# Patient Record
Sex: MALE | Race: WHITE | NOT HISPANIC OR LATINO
[De-identification: names, ages, dates, MRNs, and addresses within clinical notes are randomized per-mention and may not be internally consistent; named-entity substitution may affect disease eponyms.]

---

## 2017-05-15 ENCOUNTER — APPOINTMENT (OUTPATIENT)
Dept: FAMILY MEDICINE | Facility: CLINIC | Age: 27
End: 2017-05-15

## 2017-05-15 VITALS
WEIGHT: 115 LBS | HEART RATE: 72 BPM | HEIGHT: 69 IN | BODY MASS INDEX: 17.03 KG/M2 | SYSTOLIC BLOOD PRESSURE: 120 MMHG | RESPIRATION RATE: 12 BRPM | OXYGEN SATURATION: 99 % | TEMPERATURE: 98 F | DIASTOLIC BLOOD PRESSURE: 80 MMHG

## 2017-06-16 ENCOUNTER — APPOINTMENT (OUTPATIENT)
Dept: FAMILY MEDICINE | Facility: CLINIC | Age: 27
End: 2017-06-16

## 2017-06-16 VITALS
HEART RATE: 70 BPM | HEIGHT: 69 IN | BODY MASS INDEX: 16.88 KG/M2 | SYSTOLIC BLOOD PRESSURE: 120 MMHG | WEIGHT: 114 LBS | DIASTOLIC BLOOD PRESSURE: 80 MMHG

## 2017-06-16 RX ORDER — CLINDAMYCIN HYDROCHLORIDE 300 MG/1
300 CAPSULE ORAL
Qty: 21 | Refills: 0 | Status: DISCONTINUED | COMMUNITY
Start: 2017-05-15 | End: 2017-06-16

## 2017-06-20 LAB
25(OH)D3 SERPL-MCNC: 25 NG/ML
ALBUMIN SERPL ELPH-MCNC: 5 G/DL
ALP BLD-CCNC: 112 U/L
ALT SERPL-CCNC: 21 U/L
ANION GAP SERPL CALC-SCNC: 21 MMOL/L
APPEARANCE: CLEAR
AST SERPL-CCNC: 31 U/L
BASOPHILS # BLD AUTO: 0.05 K/UL
BASOPHILS NFR BLD AUTO: 0.9 %
BILIRUB SERPL-MCNC: 0.8 MG/DL
BILIRUBIN URINE: NEGATIVE
BLOOD URINE: NEGATIVE
BUN SERPL-MCNC: 13 MG/DL
CALCIUM SERPL-MCNC: 10.3 MG/DL
CHLORIDE SERPL-SCNC: 101 MMOL/L
CHOLEST SERPL-MCNC: 163 MG/DL
CHOLEST/HDLC SERPL: 2.3 RATIO
CO2 SERPL-SCNC: 22 MMOL/L
COLOR: YELLOW
CREAT SERPL-MCNC: 1.16 MG/DL
EOSINOPHIL # BLD AUTO: 0.13 K/UL
EOSINOPHIL NFR BLD AUTO: 2.4 %
GLUCOSE QUALITATIVE U: NORMAL MG/DL
GLUCOSE SERPL-MCNC: 85 MG/DL
HBA1C MFR BLD HPLC: 4.9 %
HCT VFR BLD CALC: 41.8 %
HDLC SERPL-MCNC: 72 MG/DL
HGB BLD-MCNC: 13.4 G/DL
IMM GRANULOCYTES NFR BLD AUTO: 0.2 %
KETONES URINE: NEGATIVE
LDLC SERPL CALC-MCNC: 74 MG/DL
LEUKOCYTE ESTERASE URINE: NEGATIVE
LYMPHOCYTES # BLD AUTO: 2.79 K/UL
LYMPHOCYTES NFR BLD AUTO: 50.5 %
MAN DIFF?: NORMAL
MCHC RBC-ENTMCNC: 27.9 PG
MCHC RBC-ENTMCNC: 32.1 GM/DL
MCV RBC AUTO: 86.9 FL
MONOCYTES # BLD AUTO: 0.35 K/UL
MONOCYTES NFR BLD AUTO: 6.3 %
NEUTROPHILS # BLD AUTO: 2.19 K/UL
NEUTROPHILS NFR BLD AUTO: 39.7 %
NITRITE URINE: NEGATIVE
PH URINE: 6
PLATELET # BLD AUTO: 217 K/UL
POTASSIUM SERPL-SCNC: 4.2 MMOL/L
PROT SERPL-MCNC: 7 G/DL
PROTEIN URINE: NEGATIVE MG/DL
RBC # BLD: 4.81 M/UL
RBC # FLD: 13.2 %
SODIUM SERPL-SCNC: 144 MMOL/L
SPECIFIC GRAVITY URINE: 1.02
TRIGL SERPL-MCNC: 84 MG/DL
TSH SERPL-ACNC: 1.6 UIU/ML
UROBILINOGEN URINE: NORMAL MG/DL
WBC # FLD AUTO: 5.52 K/UL

## 2017-08-14 ENCOUNTER — APPOINTMENT (OUTPATIENT)
Dept: FAMILY MEDICINE | Facility: CLINIC | Age: 27
End: 2017-08-14
Payer: MEDICARE

## 2017-08-14 VITALS
TEMPERATURE: 98 F | BODY MASS INDEX: 16.44 KG/M2 | HEART RATE: 72 BPM | DIASTOLIC BLOOD PRESSURE: 80 MMHG | WEIGHT: 111 LBS | SYSTOLIC BLOOD PRESSURE: 120 MMHG | HEIGHT: 69 IN

## 2017-08-14 PROCEDURE — 69210 REMOVE IMPACTED EAR WAX UNI: CPT

## 2017-08-14 PROCEDURE — 99213 OFFICE O/P EST LOW 20 MIN: CPT | Mod: 25

## 2018-04-12 ENCOUNTER — MESSAGE (OUTPATIENT)
Age: 28
End: 2018-04-12

## 2018-07-20 ENCOUNTER — APPOINTMENT (OUTPATIENT)
Dept: FAMILY MEDICINE | Facility: CLINIC | Age: 28
End: 2018-07-20
Payer: MEDICARE

## 2018-07-20 VITALS
BODY MASS INDEX: 16.44 KG/M2 | WEIGHT: 108.44 LBS | SYSTOLIC BLOOD PRESSURE: 120 MMHG | HEIGHT: 68 IN | DIASTOLIC BLOOD PRESSURE: 80 MMHG | HEART RATE: 72 BPM

## 2018-07-20 PROCEDURE — 99214 OFFICE O/P EST MOD 30 MIN: CPT

## 2018-07-20 NOTE — PLAN
[FreeTextEntry1] : Patient advised to go to [facility] as poor dental hygiene causing potential infection, leading to ear pain and cheek pain.\par \par In the interim, patient prescribed Clindamycin for 7 days, TIB by mouth and oral gel. Advised on better dental hygiene with alcohol mouthwash, flossing, and saltwater rinses.

## 2018-07-20 NOTE — HISTORY OF PRESENT ILLNESS
[Earache (L)] : pain in left ear [FreeTextEntry8] : Patient reports right sided ear pain that started a few days ago. Patient reports his right cheek and ear are painful. Report ear pain is associated with right-sided molar pain. Denies sore throat, cough, nasal discharge, ear discharge, fever, chills or headache.

## 2018-07-20 NOTE — ASSESSMENT
[FreeTextEntry1] : Poor dental hygiene- start clindamycin 300 mg po tid/mouth wash\par Possible infection causing ear pain\par [facility]

## 2018-07-20 NOTE — PHYSICAL EXAM

## 2019-03-05 ENCOUNTER — APPOINTMENT (OUTPATIENT)
Dept: FAMILY MEDICINE | Facility: CLINIC | Age: 29
End: 2019-03-05
Payer: MEDICARE

## 2019-03-05 VITALS
WEIGHT: 106 LBS | OXYGEN SATURATION: 97 % | HEART RATE: 70 BPM | DIASTOLIC BLOOD PRESSURE: 60 MMHG | TEMPERATURE: 98.1 F | HEIGHT: 68 IN | RESPIRATION RATE: 14 BRPM | SYSTOLIC BLOOD PRESSURE: 100 MMHG | BODY MASS INDEX: 16.06 KG/M2

## 2019-03-05 DIAGNOSIS — H61.23 IMPACTED CERUMEN, BILATERAL: ICD-10-CM

## 2019-03-05 DIAGNOSIS — K04.7 PERIAPICAL ABSCESS W/OUT SINUS: ICD-10-CM

## 2019-03-05 PROCEDURE — 36415 COLL VENOUS BLD VENIPUNCTURE: CPT

## 2019-03-05 PROCEDURE — G0439: CPT

## 2019-03-05 RX ORDER — CLINDAMYCIN HYDROCHLORIDE 300 MG/1
300 CAPSULE ORAL
Qty: 21 | Refills: 0 | Status: DISCONTINUED | COMMUNITY
Start: 2018-07-20 | End: 2019-03-05

## 2019-03-05 RX ORDER — CLINDAMYCIN PHOSPHATE AND BENZOYL PEROXIDE 10; 50 MG/G; MG/G
1.2-5 GEL TOPICAL TWICE DAILY
Qty: 1 | Refills: 0 | Status: DISCONTINUED | COMMUNITY
Start: 2018-07-20 | End: 2019-03-05

## 2019-03-05 NOTE — PHYSICAL EXAM
[General Appearance - Alert] : alert [General Appearance - In No Acute Distress] : in no acute distress [Sclera] : the sclera and conjunctiva were normal [Neck Appearance] : the appearance of the neck was normal [Respiration, Rhythm And Depth] : normal respiratory rhythm and effort [Auscultation Breath Sounds / Voice Sounds] : lungs were clear to auscultation bilaterally [Heart Rate And Rhythm] : heart rate was normal and rhythm regular [Heart Sounds] : normal S1 and S2 [Edema] : there was no peripheral edema [Abdomen Soft] : soft [Abdomen Tenderness] : non-tender [Cervical Lymph Nodes Enlarged Posterior Bilaterally] : posterior cervical [Cervical Lymph Nodes Enlarged Anterior Bilaterally] : anterior cervical [Supraclavicular Lymph Nodes Enlarged Bilaterally] : supraclavicular [No Spinal Tenderness] : no spinal tenderness [Abnormal Walk] : normal gait [Involuntary Movements] : no involuntary movements were seen [Musculoskeletal - Swelling] : no joint swelling seen [Skin Turgor] : normal skin turgor [] : no rash [No Focal Deficits] : no focal deficits [Oriented To Time, Place, And Person] : oriented to person, place, and time [Oropharynx] : the oropharynx was normal [FreeTextEntry1] :  more engaging and smiling

## 2019-03-05 NOTE — ASSESSMENT
[Non - Smoker] : non-smoker [FreeTextEntry2] : States he "runs" sometimes.   Advised daily walk !! [FreeTextEntry1] : CPE for 28 year old WM with PMH as stated in Active list.\par Improving depression\par Form s completed for [facility] services. \par \par Labs in office.\par \par Best wishes !

## 2019-03-05 NOTE — REVIEW OF SYSTEMS
[see HPI] : see HPI [Negative] : Neurological [Fever] : no fever [Chills] : no chills [Constipation] : no constipation [Diarrhea] : no diarrhea [Heartburn] : no heartburn
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
## 2019-03-05 NOTE — HEALTH RISK ASSESSMENT
[0] : 1) Little interest or pleasure doing things: Not at all (0) [1] : 2) Feeling down, depressed, or hopeless for several days (1) [Good] : ~his/her~  mood as  good [] : No [de-identified] : denies [de-identified] : some bowling  "not much else"  [de-identified] : POOR!

## 2019-03-05 NOTE — HEALTH RISK ASSESSMENT
[0] : 1) Little interest or pleasure doing things: Not at all (0) [1] : 2) Feeling down, depressed, or hopeless for several days (1) [Good] : ~his/her~  mood as  good [] : No [de-identified] : denies [de-identified] : some bowling  "not much else"  [de-identified] : POOR!

## 2019-03-15 LAB
ALBUMIN SERPL ELPH-MCNC: 4.9 G/DL
ALP BLD-CCNC: 100 U/L
ALT SERPL-CCNC: 15 U/L
ANION GAP SERPL CALC-SCNC: 14 MMOL/L
AST SERPL-CCNC: 27 U/L
BASOPHILS # BLD AUTO: 0.07 K/UL
BASOPHILS NFR BLD AUTO: 1.2 %
BILIRUB SERPL-MCNC: 0.4 MG/DL
BUN SERPL-MCNC: 10 MG/DL
CALCIUM SERPL-MCNC: 10.1 MG/DL
CHLORIDE SERPL-SCNC: 103 MMOL/L
CO2 SERPL-SCNC: 26 MMOL/L
CREAT SERPL-MCNC: 0.9 MG/DL
EOSINOPHIL # BLD AUTO: 0.14 K/UL
EOSINOPHIL NFR BLD AUTO: 2.5 %
GLUCOSE SERPL-MCNC: 83 MG/DL
HCT VFR BLD CALC: 44.5 %
HGB BLD-MCNC: 14.7 G/DL
IMM GRANULOCYTES NFR BLD AUTO: 0.2 %
LYMPHOCYTES # BLD AUTO: 2.22 K/UL
LYMPHOCYTES NFR BLD AUTO: 39.5 %
MAN DIFF?: NORMAL
MCHC RBC-ENTMCNC: 28.6 PG
MCHC RBC-ENTMCNC: 33 GM/DL
MCV RBC AUTO: 86.6 FL
MONOCYTES # BLD AUTO: 0.31 K/UL
MONOCYTES NFR BLD AUTO: 5.5 %
NEUTROPHILS # BLD AUTO: 2.87 K/UL
NEUTROPHILS NFR BLD AUTO: 51.1 %
PLATELET # BLD AUTO: 253 K/UL
POTASSIUM SERPL-SCNC: 4.7 MMOL/L
PROT SERPL-MCNC: 6.8 G/DL
RBC # BLD: 5.14 M/UL
RBC # FLD: 12.2 %
SODIUM SERPL-SCNC: 143 MMOL/L
TSH SERPL-ACNC: 1.02 UIU/ML
WBC # FLD AUTO: 5.62 K/UL

## 2019-05-29 ENCOUNTER — RX RENEWAL (OUTPATIENT)
Age: 29
End: 2019-05-29

## 2019-12-09 ENCOUNTER — APPOINTMENT (OUTPATIENT)
Dept: FAMILY MEDICINE | Facility: CLINIC | Age: 29
End: 2019-12-09
Payer: MEDICARE

## 2019-12-09 VITALS
HEIGHT: 68 IN | DIASTOLIC BLOOD PRESSURE: 70 MMHG | SYSTOLIC BLOOD PRESSURE: 100 MMHG | WEIGHT: 117 LBS | BODY MASS INDEX: 17.73 KG/M2 | HEART RATE: 84 BPM

## 2019-12-09 PROCEDURE — 99214 OFFICE O/P EST MOD 30 MIN: CPT | Mod: 25

## 2019-12-09 PROCEDURE — 69209 REMOVE IMPACTED EAR WAX UNI: CPT | Mod: RT,59

## 2019-12-09 NOTE — PHYSICAL EXAM
[Normal Sclera/Conjunctiva] : normal sclera/conjunctiva [No Acute Distress] : no acute distress [No Rash] : no rash [No Accessory Muscle Use] : no accessory muscle use [No Respiratory Distress] : no respiratory distress  [No Focal Deficits] : no focal deficits [Alert and Oriented x3] : oriented to person, place, and time [de-identified] : OMM  poor dentition   cerumen RIGHT EAR  [place]  [de-identified] : warm and dry

## 2019-12-09 NOTE — HISTORY OF PRESENT ILLNESS
[Earache (L)] : pain in left ear [___ Weeks ago] :  [unfilled] weeks ago [Earache (R)] : pain in right ear [Earache] : earache [Headache] : headache [Worsening] : worsening [Sore Throat] : no sore throat [Congestion] : no congestion [Cough] : no cough [Wheezing] : no wheezing [Chills] : no chills [Shortness Of Breath] : no shortness of breath [Fever] : no fever [Fatigue] : not fatigue [de-identified] : for few weeks   "I can't hear from this ear"  HX of same.  [FreeTextEntry8] : Cerumen impaction in the past was well.\par USED Debrox drops for last 5 days

## 2019-12-09 NOTE — ASSESSMENT
[FreeTextEntry1] : Successful irrigation of RIGHT EAR and patient endorses "hearing fine".\par No complications.

## 2019-12-20 ENCOUNTER — TRANSCRIPTION ENCOUNTER (OUTPATIENT)
Age: 29
End: 2019-12-20

## 2020-11-23 ENCOUNTER — APPOINTMENT (OUTPATIENT)
Dept: FAMILY MEDICINE | Facility: CLINIC | Age: 30
End: 2020-11-23
Payer: MEDICARE

## 2020-11-23 VITALS
HEART RATE: 91 BPM | BODY MASS INDEX: 19.1 KG/M2 | SYSTOLIC BLOOD PRESSURE: 110 MMHG | OXYGEN SATURATION: 98 % | HEIGHT: 68 IN | DIASTOLIC BLOOD PRESSURE: 60 MMHG | TEMPERATURE: 98.3 F | WEIGHT: 126 LBS

## 2020-11-23 PROCEDURE — 99214 OFFICE O/P EST MOD 30 MIN: CPT

## 2020-11-25 NOTE — PHYSICAL EXAM
[No Acute Distress] : no acute distress [Normal Sclera/Conjunctiva] : normal sclera/conjunctiva [No JVD] : no jugular venous distention [No Accessory Muscle Use] : no accessory muscle use [Clear to Auscultation] : lungs were clear to auscultation bilaterally [Regular Rhythm] : with a regular rhythm [Normal S1, S2] : normal S1 and S2 [No Edema] : there was no peripheral edema [Soft] : abdomen soft [Non Tender] : non-tender [Non-distended] : non-distended [No HSM] : no HSM [No Spinal Tenderness] : no spinal tenderness [No Joint Swelling] : no joint swelling [No Rash] : no rash [No Focal Deficits] : no focal deficits [Speech Grossly Normal] : speech grossly normal [Alert and Oriented x3] : oriented to person, place, and time [de-identified] : weak smiling and reasonable eye contact   CALM  [de-identified] : OMM  POOR DENTITION  [de-identified] : poor posture humped position       can  sit up straight  ad advised  [de-identified] : warm and dry  [de-identified] : although paucity of speech...

## 2020-11-25 NOTE — ASSESSMENT
[FreeTextEntry1] : agree with plan.\par Encouraged regular FU and regular daily walks .\par \par advised Multi-vitamin daily and Ensure PRN \par \par Best wishes offered. \par \par FLU vaccine in hospital.
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
## 2020-11-25 NOTE — REVIEW OF SYSTEMS
[Joint Stiffness] : joint stiffness [Back Pain] : back pain [Negative] : Neurological [Fever] : no fever [Chills] : no chills [Night Sweats] : no night sweats [Anxiety] : no anxiety [FreeTextEntry9] : back HUMPED due to poor posture   [de-identified] : improved depression

## 2020-12-03 RX ORDER — DIVALPROEX SODIUM 500 MG/1
500 TABLET, DELAYED RELEASE ORAL
Qty: 30 | Refills: 0 | Status: ACTIVE | COMMUNITY
Start: 2019-03-05 | End: 1900-01-01

## 2020-12-03 RX ORDER — DIVALPROEX SODIUM 250 MG/1
250 TABLET, EXTENDED RELEASE ORAL
Qty: 30 | Refills: 0 | Status: ACTIVE | COMMUNITY
Start: 2020-11-25 | End: 1900-01-01

## 2021-01-18 RX ORDER — RISPERIDONE 2 MG/1
2 TABLET, ORALLY DISINTEGRATING ORAL DAILY
Qty: 30 | Refills: 0 | Status: ACTIVE | COMMUNITY
Start: 2020-11-25 | End: 1900-01-01

## 2021-01-24 ENCOUNTER — RX RENEWAL (OUTPATIENT)
Age: 31
End: 2021-01-24

## 2021-01-24 RX ORDER — TRAZODONE HYDROCHLORIDE 50 MG/1
50 TABLET ORAL
Qty: 45 | Refills: 0 | Status: ACTIVE | COMMUNITY
Start: 2019-03-05 | End: 1900-01-01

## 2021-04-01 ENCOUNTER — APPOINTMENT (OUTPATIENT)
Dept: FAMILY MEDICINE | Facility: CLINIC | Age: 31
End: 2021-04-01
Payer: MEDICARE

## 2021-04-01 VITALS
BODY MASS INDEX: 22.73 KG/M2 | WEIGHT: 150 LBS | HEIGHT: 68 IN | DIASTOLIC BLOOD PRESSURE: 70 MMHG | SYSTOLIC BLOOD PRESSURE: 120 MMHG | HEART RATE: 103 BPM

## 2021-04-01 DIAGNOSIS — H61.21 IMPACTED CERUMEN, RIGHT EAR: ICD-10-CM

## 2021-04-01 DIAGNOSIS — Z09 ENCOUNTER FOR FOLLOW-UP EXAMINATION AFTER COMPLETED TREATMENT FOR CONDITIONS OTHER THAN MALIGNANT NEOPLASM: ICD-10-CM

## 2021-04-01 DIAGNOSIS — R62.50 UNSPECIFIED LACK OF EXPECTED NORMAL PHYSIOLOGICAL DEVELOPMENT IN CHILDHOOD: ICD-10-CM

## 2021-04-01 PROCEDURE — G0444 DEPRESSION SCREEN ANNUAL: CPT | Mod: 59

## 2021-04-01 PROCEDURE — 36415 COLL VENOUS BLD VENIPUNCTURE: CPT

## 2021-04-01 PROCEDURE — 99215 OFFICE O/P EST HI 40 MIN: CPT | Mod: 25

## 2021-04-01 RX ORDER — SERTRALINE HYDROCHLORIDE 50 MG/1
50 TABLET, FILM COATED ORAL DAILY
Qty: 90 | Refills: 0 | Status: DISCONTINUED | COMMUNITY
Start: 2019-03-05 | End: 2021-04-01

## 2021-04-07 PROBLEM — H61.21 IMPACTED CERUMEN OF RIGHT EAR: Status: RESOLVED | Noted: 2019-12-09 | Resolved: 2021-04-07

## 2021-04-07 PROBLEM — Z09 HOSPITAL DISCHARGE FOLLOW-UP: Status: RESOLVED | Noted: 2020-11-25 | Resolved: 2021-04-07

## 2021-04-07 LAB
ALBUMIN SERPL ELPH-MCNC: 4.7 G/DL
ALP BLD-CCNC: 107 U/L
ALT SERPL-CCNC: 26 U/L
ANION GAP SERPL CALC-SCNC: 14 MMOL/L
AST SERPL-CCNC: 35 U/L
BASOPHILS # BLD AUTO: 0.08 K/UL
BASOPHILS NFR BLD AUTO: 1.5 %
BILIRUB SERPL-MCNC: 0.3 MG/DL
BUN SERPL-MCNC: 18 MG/DL
CALCIUM SERPL-MCNC: 10.1 MG/DL
CHLORIDE SERPL-SCNC: 100 MMOL/L
CHOLEST SERPL-MCNC: 175 MG/DL
CO2 SERPL-SCNC: 26 MMOL/L
CREAT SERPL-MCNC: 0.97 MG/DL
EOSINOPHIL # BLD AUTO: 0.36 K/UL
EOSINOPHIL NFR BLD AUTO: 6.7 %
ESTIMATED AVERAGE GLUCOSE: 94 MG/DL
GLUCOSE SERPL-MCNC: 91 MG/DL
HBA1C MFR BLD HPLC: 4.9 %
HCT VFR BLD CALC: 44.5 %
HDLC SERPL-MCNC: 54 MG/DL
HGB BLD-MCNC: 14.7 G/DL
IMM GRANULOCYTES NFR BLD AUTO: 0.4 %
LDLC SERPL CALC-MCNC: 85 MG/DL
LYMPHOCYTES # BLD AUTO: 1.85 K/UL
LYMPHOCYTES NFR BLD AUTO: 34.5 %
MAN DIFF?: NORMAL
MCHC RBC-ENTMCNC: 28.4 PG
MCHC RBC-ENTMCNC: 33 GM/DL
MCV RBC AUTO: 86.1 FL
MONOCYTES # BLD AUTO: 0.39 K/UL
MONOCYTES NFR BLD AUTO: 7.3 %
NEUTROPHILS # BLD AUTO: 2.67 K/UL
NEUTROPHILS NFR BLD AUTO: 49.6 %
NONHDLC SERPL-MCNC: 120 MG/DL
PLATELET # BLD AUTO: 225 K/UL
POTASSIUM SERPL-SCNC: 4.9 MMOL/L
PROLACTIN SERPL-MCNC: 115 NG/ML
PROT SERPL-MCNC: 6.7 G/DL
RBC # BLD: 5.17 M/UL
RBC # FLD: 12.3 %
SODIUM SERPL-SCNC: 140 MMOL/L
TRIGL SERPL-MCNC: 175 MG/DL
WBC # FLD AUTO: 5.37 K/UL

## 2021-04-07 NOTE — PLAN
[FreeTextEntry1] : - Advised patient that gender change is a huge decision, and there is a process. \par - Patient gives full permission for me to speak to the physician that he has been talking to. [phone]\par - Blood work to be done in office today.

## 2021-04-07 NOTE — END OF VISIT
[FreeTextEntry3] : Medical record entries made by the scribe today, were at my direction and personally dictated to them by me, Dr. Geno Jiménez on 4/1/21. I have reviewed the chart and agree that the record accurately reflects my personal performance of the history, physical exam, assessment and plan.

## 2021-04-07 NOTE — ASSESSMENT
[FreeTextEntry1] : I was able to reach Dr. Delgado who endorses he is an on-line  provider for gender dysphoria patients and HIV management.\par \par He endorses that NEY was not prescribed estradiol and Aldactone until after labs in late February and advised to stop them when he reviewed labs and abnormalities  were noted.\par \par I advised him Ney states he has been taking meds. since late February; Dr. Delgado sates he did not prescribe until March 5 or "so".\par \par Dr. Delgado  was unaware of Ney's  cognitive delay and depression and recent hospitalization for  depression.  \par He agreed patient should not be under his care due to underlying conditions and he was to call Ney and advise him.\par  \par I will repeat labs in office today and plan MRI of brain if necessary and refer to endocrinology. \par \par Mother to be advised as well.

## 2021-04-07 NOTE — HEALTH RISK ASSESSMENT
[0] : 1) Little interest or pleasure doing things: Not at all (0) [1] : 2) Feeling down, depressed, or hopeless for several days (1) [[id number]] : 1 Normal rate, regular rhythm.  Heart sounds S1, S2.

## 2021-04-07 NOTE — PHYSICAL EXAM
[General Appearance - Alert] : alert [General Appearance - In No Acute Distress] : in no acute distress [Sclera] : the sclera and conjunctiva were normal [Oropharynx] : the oropharynx was normal [Neck Appearance] : the appearance of the neck was normal [Respiration, Rhythm And Depth] : normal respiratory rhythm and effort [Auscultation Breath Sounds / Voice Sounds] : lungs were clear to auscultation bilaterally [Heart Rate And Rhythm] : heart rate was normal and rhythm regular [Heart Sounds] : normal S1 and S2 [Edema] : there was no peripheral edema [Abdomen Soft] : soft [Abdomen Tenderness] : non-tender [Cervical Lymph Nodes Enlarged Posterior Bilaterally] : posterior cervical [Cervical Lymph Nodes Enlarged Anterior Bilaterally] : anterior cervical [Supraclavicular Lymph Nodes Enlarged Bilaterally] : supraclavicular [No Spinal Tenderness] : no spinal tenderness [Involuntary Movements] : no involuntary movements were seen [Abnormal Walk] : normal gait [Musculoskeletal - Swelling] : no joint swelling seen [Skin Turgor] : normal skin turgor [] : no rash [No Focal Deficits] : no focal deficits [Oriented To Time, Place, And Person] : oriented to person, place, and time [FreeTextEntry1] :  more engaging and smiling

## 2021-04-07 NOTE — HISTORY OF PRESENT ILLNESS
[Parent] : parent [FreeTextEntry1] : FU irregular lab results.  [de-identified] : Patient presents s/p abnormal lab results dated 3/9 2021 with elevated PROLACTIN and LoW testosterone and high estrogen. \par   Mother states that  Ney  spoke to a doctor ( Danny Nye) ( [phone] )   online (4-5x) who is  [place] of state,  via several phone calls, and now he is taking hormones (estradiol).  NEY was advised to FU with PCP for repeat labs and "maybe an MRI". \par  States he feels more a women than a man, and that he feels he "is in the wrong body." Ney confirms that he started to feel this way a couple years ago. Blood work was done at [facility] 3/9/21. REVIEWED and noted for abnormal ties.\par Ney states he started estradiol 2 mgs on 2/28 .  Patient also confirms that he is feeling slightly depressed. Confirms that he is no longer in a program, and is not doing anything all day. Physician that he has been speaking to confirms that his Prolactin levels were dangerously high and advised him to se PCP.\par NEY  States he began estradiol and Aldactone both on 2/28 \par \par Mother endorses he continues with [facility] and NP Maya Deras is provider.. [phone]

## 2021-04-15 LAB
TESTOST BND SERPL-MCNC: 3.9 PG/ML
TESTOST SERPL-MCNC: NORMAL

## 2021-05-10 ENCOUNTER — APPOINTMENT (OUTPATIENT)
Dept: MRI IMAGING | Facility: CLINIC | Age: 31
End: 2021-05-10
Payer: MEDICARE

## 2021-05-10 ENCOUNTER — OUTPATIENT (OUTPATIENT)
Dept: OUTPATIENT SERVICES | Facility: HOSPITAL | Age: 31
LOS: 1 days | End: 2021-05-10
Payer: MEDICARE

## 2021-05-10 DIAGNOSIS — F64.9 GENDER IDENTITY DISORDER, UNSPECIFIED: ICD-10-CM

## 2021-05-10 DIAGNOSIS — R79.89 OTHER SPECIFIED ABNORMAL FINDINGS OF BLOOD CHEMISTRY: ICD-10-CM

## 2021-05-10 PROCEDURE — G1004: CPT

## 2021-05-10 PROCEDURE — 70552 MRI BRAIN STEM W/DYE: CPT | Mod: 26,ME

## 2021-05-10 PROCEDURE — 70552 MRI BRAIN STEM W/DYE: CPT

## 2021-05-10 PROCEDURE — A9585: CPT

## 2021-08-17 ENCOUNTER — TRANSCRIPTION ENCOUNTER (OUTPATIENT)
Age: 31
End: 2021-08-17

## 2021-08-19 ENCOUNTER — APPOINTMENT (OUTPATIENT)
Dept: FAMILY MEDICINE | Facility: CLINIC | Age: 31
End: 2021-08-19
Payer: MEDICARE

## 2021-08-19 VITALS
OXYGEN SATURATION: 98 % | SYSTOLIC BLOOD PRESSURE: 124 MMHG | BODY MASS INDEX: 25.91 KG/M2 | HEART RATE: 102 BPM | HEIGHT: 68 IN | WEIGHT: 171 LBS | DIASTOLIC BLOOD PRESSURE: 72 MMHG

## 2021-08-19 DIAGNOSIS — Z86.59 PERSONAL HISTORY OF OTHER MENTAL AND BEHAVIORAL DISORDERS: ICD-10-CM

## 2021-08-19 PROCEDURE — 99214 OFFICE O/P EST MOD 30 MIN: CPT

## 2021-08-25 NOTE — PLAN
[FreeTextEntry1] : Well exam for 30 year y/o M with PMH as noted in HPI/active list.\par \par Management:\par \par See HPI\par \par ADVISED Walk 30 min 2x / day!\par COnsider GYM ! \par \par WATCH SWEETS and bread! \par \par \par \par Best wishes offered !

## 2021-08-25 NOTE — END OF VISIT
[FreeTextEntry3] : Medical record entries made by the scribe today were at my direction and personally dictated to them by me, Dr. Geno Russell on 08/19/2021. I have reviewed the chart and agree that the record accurately reflects my personal performance of the history, physical exam, assessment and plan.

## 2021-08-25 NOTE — PHYSICAL EXAM
[No Acute Distress] : no acute distress [Normal Sclera/Conjunctiva] : normal sclera/conjunctiva [No JVD] : no jugular venous distention [No Respiratory Distress] : no respiratory distress  [No Accessory Muscle Use] : no accessory muscle use [Normal Rate] : normal rate  [No Rash] : no rash [Coordination Grossly Intact] : coordination grossly intact [Alert and Oriented x3] : oriented to person, place, and time [Soft] : abdomen soft [Non Tender] : non-tender [de-identified] : OMM  POOR DENTITION   Has appt. at [facility]  [de-identified] : B LE edema  non pittting

## 2021-08-25 NOTE — HISTORY OF PRESENT ILLNESS
[FreeTextEntry8] : Accompanied by mother: \par \par Ney is a 31 y/o M who presents to the office today c/o swollen ankles for last few weeks. \par \par States his activity has been decreased as he is not  working OR EXERCISING ; "SITS Alot" \par \par  Weight GAIN ( Depakote) and not watching his diet as much.

## 2021-09-20 ENCOUNTER — APPOINTMENT (OUTPATIENT)
Dept: FAMILY MEDICINE | Facility: CLINIC | Age: 31
End: 2021-09-20
Payer: MEDICARE

## 2021-09-20 VITALS
OXYGEN SATURATION: 97 % | BODY MASS INDEX: 26.22 KG/M2 | HEART RATE: 98 BPM | HEIGHT: 68 IN | SYSTOLIC BLOOD PRESSURE: 118 MMHG | WEIGHT: 173 LBS | DIASTOLIC BLOOD PRESSURE: 78 MMHG

## 2021-09-20 DIAGNOSIS — F64.9 GENDER IDENTITY DISORDER, UNSPECIFIED: ICD-10-CM

## 2021-09-20 PROCEDURE — 99213 OFFICE O/P EST LOW 20 MIN: CPT

## 2021-09-27 PROBLEM — F64.9 GENDER IDENTITY DISORDER: Status: ACTIVE | Noted: 2021-04-07

## 2021-09-27 NOTE — PHYSICAL EXAM
[No Acute Distress] : no acute distress [Normal Sclera/Conjunctiva] : normal sclera/conjunctiva [No JVD] : no jugular venous distention [No Respiratory Distress] : no respiratory distress  [No Accessory Muscle Use] : no accessory muscle use [Normal Rate] : normal rate  [Soft] : abdomen soft [Non Tender] : non-tender [No Rash] : no rash [Coordination Grossly Intact] : coordination grossly intact [Alert and Oriented x3] : oriented to person, place, and time [de-identified] : OMM  POOR DENTITION   Has appt. at [facility]  [de-identified] : B LE edema  non pittting

## 2021-09-27 NOTE — HISTORY OF PRESENT ILLNESS
[FreeTextEntry1] : Pt is following up on swollen ankles as noted at encounter in AUGUST 2021.\par SInce has  been more active "even playing tennis some" and cutting back on sweets.\par \par Has appt. with Endocrine at [place] in the near future to discuss hormone therapy.\par \par Also has FU with [facility].  [FreeTextEntry8] : IN REVIEW: August 19:\par \par Accompanied by mother: \par \par Ney is a 31 y/o M who presents to the office today c/o swollen ankles for last few weeks. \par \par States his activity has been decreased as he is not  working OR EXERCISING ; "SITS Alot" \par \par  Weight GAIN ( Depakote) and not watching his diet as much.

## 2021-09-27 NOTE — ASSESSMENT
[FreeTextEntry1] : LEG swelling no appreciable and likely due to weight   gain and atrophy. \par \par COntinue current efforts!!\par \par FU as noted with endocrine \par \par

## 2021-11-11 ENCOUNTER — APPOINTMENT (OUTPATIENT)
Dept: ENDOCRINOLOGY | Facility: CLINIC | Age: 31
End: 2021-11-11

## 2022-02-10 ENCOUNTER — APPOINTMENT (OUTPATIENT)
Dept: ENDOCRINOLOGY | Facility: CLINIC | Age: 32
End: 2022-02-10
Payer: MEDICARE

## 2022-02-10 VITALS
HEIGHT: 68 IN | HEART RATE: 114 BPM | RESPIRATION RATE: 16 BRPM | DIASTOLIC BLOOD PRESSURE: 82 MMHG | SYSTOLIC BLOOD PRESSURE: 124 MMHG | OXYGEN SATURATION: 98 % | WEIGHT: 179 LBS | BODY MASS INDEX: 27.13 KG/M2 | TEMPERATURE: 98.1 F

## 2022-02-10 PROCEDURE — 99204 OFFICE O/P NEW MOD 45 MIN: CPT

## 2022-02-10 RX ORDER — TRAZODONE HYDROCHLORIDE 50 MG/1
50 TABLET ORAL
Qty: 54 | Refills: 2 | Status: COMPLETED | COMMUNITY
Start: 2020-11-25 | End: 2022-02-10

## 2022-02-17 ENCOUNTER — APPOINTMENT (OUTPATIENT)
Dept: FAMILY MEDICINE | Facility: CLINIC | Age: 32
End: 2022-02-17
Payer: MEDICARE

## 2022-02-17 ENCOUNTER — NON-APPOINTMENT (OUTPATIENT)
Age: 32
End: 2022-02-17

## 2022-02-17 VITALS
SYSTOLIC BLOOD PRESSURE: 110 MMHG | BODY MASS INDEX: 26.22 KG/M2 | HEART RATE: 110 BPM | DIASTOLIC BLOOD PRESSURE: 90 MMHG | HEIGHT: 68 IN | WEIGHT: 173 LBS

## 2022-02-17 DIAGNOSIS — Z87.898 PERSONAL HISTORY OF OTHER SPECIFIED CONDITIONS: ICD-10-CM

## 2022-02-17 PROCEDURE — 93000 ELECTROCARDIOGRAM COMPLETE: CPT

## 2022-02-17 PROCEDURE — 99214 OFFICE O/P EST MOD 30 MIN: CPT | Mod: 25

## 2022-02-23 PROBLEM — Z87.898 HISTORY OF CARIES: Status: RESOLVED | Noted: 2017-05-15 | Resolved: 2022-02-23

## 2022-02-23 LAB
ESTRADIOL SERPL-MCNC: 9 PG/ML
FSH SERPL-MCNC: 5.3 IU/L
IGF-1 INTERP: NORMAL
IGF-I BLD-MCNC: 195 NG/ML
LH SERPL-ACNC: 5.6 IU/L
PROLACTIN SERPL-MCNC: 77 NG/ML
T3 SERPL-MCNC: 167 NG/DL
T4 FREE SERPL-MCNC: 1.3 NG/DL
TSH SERPL-ACNC: 2.08 UIU/ML

## 2022-02-23 NOTE — ADDENDUM
[FreeTextEntry1] : Medical record entries made by the scribe today, were at my direction and personally dictated to them by me, Dr. Geno Jiménez on 02/17/2022.  I have reviewed the chart and agree that the record accurately reflects my personal performance of the history, physical exam, assessment and plan.\par \par Helio HANDLEY acting as scribe for Dr. Geno Jiménez MD on 02/17/2022 at 3:19 PM.\par

## 2022-02-23 NOTE — HISTORY OF PRESENT ILLNESS
[FreeTextEntry1] : FU\par Last seen in September 2021 for FU swollen ankles, encounter reviewed.  [de-identified] : SALIMA ABEL is a 31 year old M who presents today for a follow up visit.  Pt presented to the office today in hopes of having an echocardiogram performed, explained to pt that echo cannot be done here, pt completely understanding.  \par \par Pt states that he is not currently working, feels "good."  States that he has not asked psychiatrist for assistance with job search.\par  Endocrinology Consult for Pituitary adenoma reviewed and noted for some LAB AU pending as well as advised ECHO as he had VSD as child.\par \par DENTURES complete and look GREAT!

## 2022-02-23 NOTE — PHYSICAL EXAM
[No Acute Distress] : no acute distress [Normal Sclera/Conjunctiva] : normal sclera/conjunctiva [No JVD] : no jugular venous distention [No Respiratory Distress] : no respiratory distress  [Normal Rate] : normal rate  [No Edema] : there was no peripheral edema [No Focal Deficits] : no focal deficits [Speech Grossly Normal] : speech grossly normal [Alert and Oriented x3] : oriented to person, place, and time [de-identified] : calm and engaging

## 2022-02-23 NOTE — ASSESSMENT
[FreeTextEntry1] : FU for 31 year old WM with PMH as stated in HPI / active list. \par \par Management : \par \par See HPI and Plan.\par \par EKG in office today, will advise.  Cardiology referral provided for echo.  \par \par Encouraged to ask his psychiatrist or consult with  family service league to assist with job search.   \par \par Best wishes offered!

## 2022-02-24 LAB
CORTIS 24H UR-MCNC: 24 H
CORTIS 24H UR-MRATE: 6 MCG/24 H
SPECIMEN VOL 24H UR: 1325 ML

## 2022-02-25 ENCOUNTER — NON-APPOINTMENT (OUTPATIENT)
Age: 32
End: 2022-02-25

## 2022-03-31 ENCOUNTER — APPOINTMENT (OUTPATIENT)
Dept: FAMILY MEDICINE | Facility: CLINIC | Age: 32
End: 2022-03-31
Payer: MEDICARE

## 2022-03-31 VITALS
DIASTOLIC BLOOD PRESSURE: 80 MMHG | HEART RATE: 89 BPM | HEIGHT: 68 IN | WEIGHT: 172 LBS | BODY MASS INDEX: 26.07 KG/M2 | OXYGEN SATURATION: 98 % | SYSTOLIC BLOOD PRESSURE: 122 MMHG

## 2022-03-31 PROCEDURE — 99214 OFFICE O/P EST MOD 30 MIN: CPT

## 2022-04-05 NOTE — REVIEW OF SYSTEMS
[see HPI] : see HPI [Negative] : Neurological [Fever] : no fever [Chills] : no chills [Constipation] : no constipation [Diarrhea] : no diarrhea [Heartburn] : no heartburn Dressing (No Sutures): dry sterile dressing

## 2022-04-09 NOTE — HISTORY OF PRESENT ILLNESS
[FreeTextEntry1] : FU\par Last seen in February 2022 for same, encounter reviewed.  [de-identified] : NEY ABEL is a 31 year old M who presents today for a follow up visit regarding right leg pain s/p mechanical fall that occurred Saturday 3/26/22.  Pt is accompanied by his mother in office today, mom states that he fell and has been complaining of RLE and groin pain since the fall.  Mother states that they have been treating pain with naproxen for pain, qd.\par \par Ney is concerned for discomfort and swelling in Right groin.\par \par Endo appreciated for evaluation of  pituitary microadenoma; only abnormal hormone lab is elevated prolactin.. \par NEEDS ECHO ( hx VSD) prior to treatment weekly with cabergoline. \par \par Mother and patient advised again. WIll FU. \par

## 2022-04-09 NOTE — ADDENDUM
[FreeTextEntry1] : Medical record entries made by the scribe today, were at my direction and personally dictated to them by me, Dr. Geno Jiménez on 03/31/2022.  I have reviewed the chart and agree that the record accurately reflects my personal performance of the history, physical exam, assessment and plan.\par \par Helio HANDLEY acting as scribe for Dr. Geno Jiménez MD on 03/31/2022 at 4:37 PM.\par

## 2022-04-09 NOTE — PHYSICAL EXAM
[No Acute Distress] : no acute distress [Normal Sclera/Conjunctiva] : normal sclera/conjunctiva [No JVD] : no jugular venous distention [No Respiratory Distress] : no respiratory distress  [Normal Rate] : normal rate  [No Edema] : there was no peripheral edema [Soft] : abdomen soft [Non Tender] : non-tender [No Hernias] : no hernias [No Focal Deficits] : no focal deficits [Speech Grossly Normal] : speech grossly normal [Alert and Oriented x3] : oriented to person, place, and time [Regular Rhythm] : with a regular rhythm [de-identified] : calm and somewhat engaging  [de-identified] : no hernia present  inguinal or umbilical  [de-identified] : RIGHT LE with no considerable swelling or injury   FROM and strength of ankle.  [de-identified] : flat-footed gait

## 2022-04-09 NOTE — REVIEW OF SYSTEMS
[Joint Pain] : joint pain [Negative] : Psychiatric [Fever] : no fever [Chills] : no chills [Night Sweats] : no night sweats [FreeTextEntry9] : RIGHT ankle pain, see HPI

## 2022-07-28 ENCOUNTER — APPOINTMENT (OUTPATIENT)
Dept: ENDOCRINOLOGY | Facility: CLINIC | Age: 32
End: 2022-07-28

## 2022-07-28 VITALS
SYSTOLIC BLOOD PRESSURE: 130 MMHG | OXYGEN SATURATION: 98 % | HEIGHT: 68 IN | BODY MASS INDEX: 27.58 KG/M2 | DIASTOLIC BLOOD PRESSURE: 80 MMHG | HEART RATE: 87 BPM | TEMPERATURE: 98 F | WEIGHT: 182 LBS | RESPIRATION RATE: 16 BRPM

## 2022-07-28 PROCEDURE — 99214 OFFICE O/P EST MOD 30 MIN: CPT

## 2022-09-22 ENCOUNTER — NON-APPOINTMENT (OUTPATIENT)
Age: 32
End: 2022-09-22

## 2022-12-01 ENCOUNTER — APPOINTMENT (OUTPATIENT)
Dept: FAMILY MEDICINE | Facility: CLINIC | Age: 32
End: 2022-12-01

## 2022-12-01 VITALS
WEIGHT: 178 LBS | OXYGEN SATURATION: 98 % | BODY MASS INDEX: 26.98 KG/M2 | SYSTOLIC BLOOD PRESSURE: 130 MMHG | HEIGHT: 68 IN | HEART RATE: 92 BPM | DIASTOLIC BLOOD PRESSURE: 90 MMHG

## 2022-12-01 PROCEDURE — 99214 OFFICE O/P EST MOD 30 MIN: CPT

## 2022-12-02 ENCOUNTER — APPOINTMENT (OUTPATIENT)
Dept: ENDOCRINOLOGY | Facility: CLINIC | Age: 32
End: 2022-12-02

## 2022-12-05 NOTE — ADDENDUM
[FreeTextEntry1] : Medical record entries made by the scribe today, were at my direction and personally dictated to them by me, Dr. Geno Jiménez on 12/01/2022.  I have reviewed the chart and agree that the record accurately reflects my personal performance of the history, physical exam, assessment and plan.\par \par Helio HANDLEY acting as scribe for Dr. Geno Jiménez MD on 12/01/2022 at 3:51 PM.\par

## 2022-12-05 NOTE — PHYSICAL EXAM
[No Acute Distress] : no acute distress [Normal Sclera/Conjunctiva] : normal sclera/conjunctiva [No Respiratory Distress] : no respiratory distress  [No Accessory Muscle Use] : no accessory muscle use [de-identified] : calm and engaging  [de-identified] : OMM  no teeth  does have dentures   BEARDED  [de-identified] : candidiasis present right lower extremity/groin region

## 2022-12-05 NOTE — ASSESSMENT
[FreeTextEntry1] : Acute encounter for 32 year old WM with PMH as stated in HPI / active list. \par \par Management : \par \par See HPI and Plan.\par \par Nystatin cream alternating with powder  to be applied to affected area, apply BID.  Advised thin layer of powder to be applied at bed time.\par \par Advised to keep area dry, dry well after showering.  \par \par Best wishes offered!

## 2022-12-05 NOTE — HISTORY OF PRESENT ILLNESS
[Parent] : parent [FreeTextEntry8] : SALIMA ABEL is a 32 year old M who presents today for acute rash on Right leg onset "months ago."  Right lower extremity.  Pt states it is itchy, home remedies provided no relief.  \par \par Accompanied by his mother today.

## 2023-01-05 ENCOUNTER — APPOINTMENT (OUTPATIENT)
Dept: FAMILY MEDICINE | Facility: CLINIC | Age: 33
End: 2023-01-05
Payer: MEDICARE

## 2023-01-05 VITALS
OXYGEN SATURATION: 98 % | WEIGHT: 180 LBS | HEART RATE: 104 BPM | DIASTOLIC BLOOD PRESSURE: 84 MMHG | HEIGHT: 68 IN | SYSTOLIC BLOOD PRESSURE: 124 MMHG | BODY MASS INDEX: 27.28 KG/M2

## 2023-01-05 VITALS — HEART RATE: 98 BPM

## 2023-01-05 DIAGNOSIS — Z00.00 ENCOUNTER FOR GENERAL ADULT MEDICAL EXAMINATION W/OUT ABNORMAL FINDINGS: ICD-10-CM

## 2023-01-05 DIAGNOSIS — B37.2 CANDIDIASIS OF SKIN AND NAIL: ICD-10-CM

## 2023-01-05 DIAGNOSIS — M79.89 OTHER SPECIFIED SOFT TISSUE DISORDERS: ICD-10-CM

## 2023-01-05 DIAGNOSIS — F32.A DEPRESSION, UNSPECIFIED: ICD-10-CM

## 2023-01-05 PROCEDURE — 99395 PREV VISIT EST AGE 18-39: CPT | Mod: 25,GY

## 2023-01-05 PROCEDURE — 36415 COLL VENOUS BLD VENIPUNCTURE: CPT

## 2023-01-05 RX ORDER — NYSTATIN 100000 [USP'U]/G
100000 CREAM TOPICAL TWICE DAILY
Qty: 1 | Refills: 1 | Status: DISCONTINUED | COMMUNITY
Start: 2022-12-01 | End: 2023-01-05

## 2023-01-05 RX ORDER — NYSTATIN 100000 1/G
100000 POWDER TOPICAL 3 TIMES DAILY
Qty: 1 | Refills: 1 | Status: DISCONTINUED | COMMUNITY
Start: 2022-12-01 | End: 2023-01-05

## 2023-01-08 LAB
ALBUMIN SERPL ELPH-MCNC: 5.1 G/DL
ALP BLD-CCNC: 125 U/L
ALT SERPL-CCNC: 43 U/L
ANION GAP SERPL CALC-SCNC: 19 MMOL/L
AST SERPL-CCNC: 33 U/L
BASOPHILS # BLD AUTO: 0.08 K/UL
BASOPHILS NFR BLD AUTO: 1.1 %
BILIRUB SERPL-MCNC: 0.7 MG/DL
BUN SERPL-MCNC: 13 MG/DL
CALCIUM SERPL-MCNC: 10.1 MG/DL
CHLORIDE SERPL-SCNC: 100 MMOL/L
CHOLEST SERPL-MCNC: 174 MG/DL
CO2 SERPL-SCNC: 24 MMOL/L
CREAT SERPL-MCNC: 1.06 MG/DL
EGFR: 96 ML/MIN/1.73M2
EOSINOPHIL # BLD AUTO: 0.27 K/UL
EOSINOPHIL NFR BLD AUTO: 3.6 %
ESTIMATED AVERAGE GLUCOSE: 100 MG/DL
GLUCOSE SERPL-MCNC: 80 MG/DL
HBA1C MFR BLD HPLC: 5.1 %
HCT VFR BLD CALC: 46.8 %
HDLC SERPL-MCNC: 49 MG/DL
HGB BLD-MCNC: 15.6 G/DL
IMM GRANULOCYTES NFR BLD AUTO: 0.1 %
LDLC SERPL CALC-MCNC: 78 MG/DL
LYMPHOCYTES # BLD AUTO: 3.92 K/UL
LYMPHOCYTES NFR BLD AUTO: 52.9 %
MAN DIFF?: NORMAL
MCHC RBC-ENTMCNC: 28.9 PG
MCHC RBC-ENTMCNC: 33.3 GM/DL
MCV RBC AUTO: 86.8 FL
MONOCYTES # BLD AUTO: 0.44 K/UL
MONOCYTES NFR BLD AUTO: 5.9 %
NEUTROPHILS # BLD AUTO: 2.69 K/UL
NEUTROPHILS NFR BLD AUTO: 36.4 %
NONHDLC SERPL-MCNC: 125 MG/DL
PLATELET # BLD AUTO: 249 K/UL
POTASSIUM SERPL-SCNC: 4.5 MMOL/L
PROLACTIN SERPL-MCNC: 80.7 NG/ML
PROT SERPL-MCNC: 7 G/DL
RBC # BLD: 5.39 M/UL
RBC # FLD: 12.7 %
SODIUM SERPL-SCNC: 143 MMOL/L
T4 SERPL-MCNC: 8.3 UG/DL
TRIGL SERPL-MCNC: 235 MG/DL
TSH SERPL-ACNC: 2.02 UIU/ML
WBC # FLD AUTO: 7.41 K/UL

## 2023-01-08 NOTE — HEALTH RISK ASSESSMENT
[Good] : ~his/her~  mood as  good [de-identified] : denies [de-identified] : some bowling  "not much else"  [de-identified] : POOR!

## 2023-01-08 NOTE — PLAN
[FreeTextEntry1] : In review March 2019:\par CPE for 28 year old WM with PMH as stated in Active list.\par Improving depression\par Form s completed for [facility] services. \par \par Labs in office.\par \par Best wishes !

## 2023-01-08 NOTE — PHYSICAL EXAM
[General Appearance - Alert] : alert [General Appearance - In No Acute Distress] : in no acute distress [Sclera] : the sclera and conjunctiva were normal [Oropharynx] : the oropharynx was normal [Neck Appearance] : the appearance of the neck was normal [Respiration, Rhythm And Depth] : normal respiratory rhythm and effort [Auscultation Breath Sounds / Voice Sounds] : lungs were clear to auscultation bilaterally [Heart Rate And Rhythm] : heart rate was normal and rhythm regular [Heart Sounds] : normal S1 and S2 [Edema] : there was no peripheral edema [Abdomen Soft] : soft [Abdomen Tenderness] : non-tender [Cervical Lymph Nodes Enlarged Posterior Bilaterally] : posterior cervical [Cervical Lymph Nodes Enlarged Anterior Bilaterally] : anterior cervical [Supraclavicular Lymph Nodes Enlarged Bilaterally] : supraclavicular [No Spinal Tenderness] : no spinal tenderness [Abnormal Walk] : normal gait [Involuntary Movements] : no involuntary movements were seen [Musculoskeletal - Swelling] : no joint swelling seen [Skin Turgor] : normal skin turgor [] : no rash [No Focal Deficits] : no focal deficits [Oriented To Time, Place, And Person] : oriented to person, place, and time [FreeTextEntry1] :  more engaging and smiling

## 2023-01-08 NOTE — HISTORY OF PRESENT ILLNESS
[FreeTextEntry1] : CPE\par Last seen in December 2022 for acute rash, last CPE March 2019, encounters reviewed.\par Recent consults reviewed and noted for:\par Endo (Prolactin increased, Pituitary adenoma f/u, July 2022) NOTED FOR STABILITY\par Hormone replacement of estrogen not advised due to prolactinoma.  No discussion initiated by patient today \par Continues Psych care at [facility] . [de-identified] : \humza SALIMA ABEL is a 32 year old M who presents today for a CPE.  Pt has no complaints, has been well, and denies any recent ER visits/hospitalizations/MVA's or MSK injuries.  States rash from December 2022 encounter has resolved completely.  \par \par Pt states some back pain, states he typically hunches while standing and sitting.  Sleeps primarily on back, does not believes he snores, states no one tells him that he snores.  Has no complaints regarding sleep or sleeping habits.  \par \par Pt states no changes to psych medications, states compliance, typically takes medications in early afternoon.  States compliance with all medications as directed.  \humza gaines Presents with dentures in, states bottom dentures do not fit, plans on following with dentist regarding.  \humza gaines Is now working with his father as a .

## 2023-01-08 NOTE — COUNSELING
[de-identified] : Again the value and NEVA  of daily walk, 30 minutes 5 x's per week,  stressed with regard  to overall health, mental health and bone density\par \par Importance of drinking water of 60-80 ounces daily discussed and encouraged.\par

## 2023-01-08 NOTE — ASSESSMENT
[FreeTextEntry1] : CPE for 32 year old WM with PMH as stated in HPI / active list. \par \par Management : \par \par See HPI and Plan.\par \par Labs in office today, will advise. Medications to be reconciled.\par \par Advised dental f/u regarding bottom dentures.  \par \par Advised improved posture regarding intermittent back pains, demonstrated in office today with understanding shown by pt.  \par \par Continue current medication regimens. Continue healthy lifestyle choices!\par \par Pt declined the influenza vaccine today. \par \par Best wishes offered!

## 2023-01-08 NOTE — REVIEW OF SYSTEMS
[Negative] : Neurological [see HPI] : see HPI [Fever] : no fever [Chills] : no chills [Constipation] : no constipation [Diarrhea] : no diarrhea [Heartburn] : no heartburn

## 2023-02-09 ENCOUNTER — APPOINTMENT (OUTPATIENT)
Dept: ENDOCRINOLOGY | Facility: CLINIC | Age: 33
End: 2023-02-09
Payer: MEDICARE

## 2023-02-09 VITALS
RESPIRATION RATE: 15 BRPM | BODY MASS INDEX: 27.89 KG/M2 | WEIGHT: 184 LBS | HEIGHT: 68 IN | DIASTOLIC BLOOD PRESSURE: 85 MMHG | SYSTOLIC BLOOD PRESSURE: 141 MMHG | TEMPERATURE: 98 F | HEART RATE: 99 BPM | OXYGEN SATURATION: 97 %

## 2023-02-09 PROCEDURE — 99214 OFFICE O/P EST MOD 30 MIN: CPT

## 2023-05-02 ENCOUNTER — APPOINTMENT (OUTPATIENT)
Dept: MRI IMAGING | Facility: CLINIC | Age: 33
End: 2023-05-02
Payer: MEDICARE

## 2023-05-02 ENCOUNTER — OUTPATIENT (OUTPATIENT)
Dept: OUTPATIENT SERVICES | Facility: HOSPITAL | Age: 33
LOS: 1 days | End: 2023-05-02
Payer: MEDICARE

## 2023-05-02 DIAGNOSIS — D35.2 BENIGN NEOPLASM OF PITUITARY GLAND: ICD-10-CM

## 2023-05-02 PROCEDURE — 70553 MRI BRAIN STEM W/O & W/DYE: CPT | Mod: 26,MH

## 2023-05-02 PROCEDURE — A9585: CPT

## 2023-05-02 PROCEDURE — 70553 MRI BRAIN STEM W/O & W/DYE: CPT

## 2023-05-05 ENCOUNTER — EMERGENCY (EMERGENCY)
Facility: HOSPITAL | Age: 33
LOS: 1 days | Discharge: HOME | End: 2023-05-05
Attending: EMERGENCY MEDICINE
Payer: MEDICARE

## 2023-05-05 VITALS
DIASTOLIC BLOOD PRESSURE: 90 MMHG | SYSTOLIC BLOOD PRESSURE: 137 MMHG | TEMPERATURE: 103 F | WEIGHT: 184.97 LBS | HEART RATE: 85 BPM | OXYGEN SATURATION: 97 % | RESPIRATION RATE: 18 BRPM

## 2023-05-05 PROCEDURE — 99283 EMERGENCY DEPT VISIT LOW MDM: CPT | Mod: FS

## 2023-05-05 PROCEDURE — 99282 EMERGENCY DEPT VISIT SF MDM: CPT

## 2023-05-05 NOTE — ED PROVIDER NOTE - PATIENT PORTAL LINK FT
You can access the FollowMyHealth Patient Portal offered by [facility] by registering at the following website: [email or web address]. By joining [employer]’s FollowMyHealth portal, you will also be able to view your health information using other applications (apps) compatible with our system.

## 2023-05-05 NOTE — ED PROVIDER NOTE - NS ED ATTENDING STATEMENT MOD
This was a shared visit with the AURA. I reviewed and verified the documentation and independently performed the documented:

## 2023-05-05 NOTE — ED PROVIDER NOTE - OBJECTIVE STATEMENT
32 year old male PMHx aspergers syndrome, pituitary adenoma presents to ED for bruising of left arm. Pt mother reports he had IV placed on tuesday for MRI, at that time nurse stated "hard time to get stick". Pt reports bruising and swelling to area occurred 2 hours after IV removal. Pt reports soreness to area of bruise that is about 3bfq0mo. Pt denies fevers, chills, discharge, streaking, redness, sweats, coldness, numbness, tingling, weakness, SOB, chest pain, abdominal pain.

## 2023-05-05 NOTE — ED PROVIDER NOTE - CLINICAL SUMMARY MEDICAL DECISION MAKING FREE TEXT BOX
32 year old male PMHx aspergers syndrome, pituitary adenoma presents to ED for bruising of left arm. Pt mother reports he had IV placed on tuesday for MRI, at that time nurse stated "hard time to get stick". Pt reports bruising and swelling to area occurred 2 hours after IV removal. Pt reports soreness to area of bruise that is about 2qfv2tv. Pt denies fevers, chills, discharge, streaking, redness, sweats, coldness, numbness, tingling, weakness, SOB, chest pain, abdominal pain.  +ecchymosis left ventral forearm.  Ice pack, pt education, pt does not wish to stay for IBU at this time.   Pt reassessed, pt feeling better at this time, vss, pt able to walk, talk and vocalized plan of action. Discussed in depth and explained to pt in depth the next steps that need to be taken including proper follow up with PCP or specialists. All incidental findings were discussed with pt as well. Pt verbalized their concerns and all questions were answered. Pt understands dispo and wants discharge. Given good instructions when to return to ED, strict return precautions and importance of f/u.

## 2023-05-05 NOTE — ED ADULT TRIAGE NOTE - CHIEF COMPLAINT QUOTE
Pt c/o L arm bruise/pain after having IV removed at [facility]. Pt noted to be febrile. Pt c/o L arm bruise/pain after having IV removed at [facility].

## 2023-05-05 NOTE — ED PROVIDER NOTE - PHYSICAL EXAMINATION
Gen: No acute distress, non toxic  HEENT: Mucous membranes moist, pink conjunctivae, EOMI  CV: RRR, nl s1/s2.  Resp: CTAB, normal rate and effort  GI: Abdomen soft, NT, ND. No rebound, no guarding  : No CVAT  Neuro: A&O x 3, moving all 4 extremities  MSK: Left Forearm non-tender. No spine or joint tenderness to palpation  Skin: Left proximal ventral forearm with [id number] ecchymosis  purple, yellow color. No fluctuance, erythema, swelling, discharge. No rashes. intact and perfused. Cap refill <2sec  Vascular: Radial and dorsalis pedal pulses 2+ b/l

## 2023-05-05 NOTE — ED PROVIDER NOTE - NSFOLLOWUPINSTRUCTIONS_ED_ALL_ED_FT
- Apply ice to affected area for 15-20 minutes every few hours for the next few days.  Use as much or as frequently as desired.   - take ibuprofen every 6 hours as needed      SEEK IMMEDIATE MEDICAL CARE IF YOU HAVE ANY OF THE FOLLOWING SYMPTOMS: worsening fever, red streaks coming from affected area, vomiting or diarrhea, or dizziness/lightheadedness.

## 2023-05-06 NOTE — ED ADULT NURSE NOTE - NS ED NURSE LEVEL OF CONSCIOUSNESS MENTAL STATUS
"Principal Problem:Left knee DJD    Principal Orthopedic Problem: sp left TKA 10/5/2020 by Dr. Israel    Interval History: NAEON. Pain controlled. Ambulated 110 feet with PT yesterday. Denies N/V, chest pain, or SOB. Voiding without difficulty.     Review of patient's allergies indicates:   Allergen Reactions    Hydrocodone-acetaminophen Rash     Other reaction(s): constipated       Current Facility-Administered Medications   Medication    0.9%  NaCl infusion    acetaminophen tablet 1,000 mg    aspirin EC tablet 81 mg    bisacodyL suppository 10 mg    celecoxib capsule 200 mg    dextrose 50% injection 12.5 g    dextrose 50% injection 25 g    famotidine tablet 20 mg    glucagon (human recombinant) injection 1 mg    glucose chewable tablet 16 g    glucose chewable tablet 24 g    insulin aspart U-100 pen 0-5 Units    levothyroxine tablet 125 mcg    metoprolol tartrate (LOPRESSOR) tablet 25 mg    morphine injection 2 mg    mupirocin 2 % ointment 1 g    naloxone 0.4 mg/mL injection 0.02 mg    ondansetron injection 4 mg    oxyCODONE immediate release tablet 10 mg    oxyCODONE immediate release tablet 5 mg    polyethylene glycol packet 17 g    pregabalin capsule 75 mg    promethazine (PHENERGAN) 6.25 mg in dextrose 5 % 50 mL IVPB    ramipriL capsule 10 mg    ropivacaine (PF) 2 mg/ml (0.2%) infusion    ropivacaine 0.2% ON-Q C-BLOC 400 ML (SELECT A FLOW)    senna-docusate 8.6-50 mg per tablet 1 tablet    tamsulosin 24 hr capsule 0.4 mg     Objective:     Vital Signs (Most Recent):  Temp: 98.5 °F (36.9 °C) (10/06/20 0436)  Pulse: 70 (10/06/20 0436)  Resp: 16 (10/06/20 0436)  BP: 126/67 (10/06/20 0436)  SpO2: 97 % (10/06/20 0436) Vital Signs (24h Range):  Temp:  [95.8 °F (35.4 °C)-98.5 °F (36.9 °C)] 98.5 °F (36.9 °C)  Pulse:  [53-90] 70  Resp:  [8-21] 16  SpO2:  [95 %-100 %] 97 %  BP: ()/(60-84) 126/67     Weight: 78.5 kg (173 lb)  Height: 5' 7" (170.2 cm)  Body mass index is 27.1 " kg/m².      Intake/Output Summary (Last 24 hours) at 10/6/2020 0641  Last data filed at 10/5/2020 1700  Gross per 24 hour   Intake 4281.37 ml   Output --   Net 4281.37 ml       Ortho/SPM Exam  Left knee exam  Polar back in place over bulky dressings   Bulky dressings removed and surgical dressing inspected- c/d/i with no strike through  Able to straight leg raise  5/5 DF/PF ankle and hallux  Toes WWP    Adductor canal catheter in place at 6cc/hr      Significant Labs: All pertinent labs within the past 24 hours have been reviewed.     POC glucose 165-266    Significant Imaging: I have reviewed all pertinent imaging results/findings.   Awake/Alert

## 2023-05-06 NOTE — ED ADULT NURSE NOTE - NSIMPLEMENTINTERV_GEN_ALL_ED
Implemented All Universal Safety Interventions:  [place] to call system. Call bell, personal items and telephone within reach. Instruct patient to call for assistance. Room bathroom lighting operational. Non-slip footwear when patient is off stretcher. Physically safe environment: no spills, clutter or unnecessary equipment. Stretcher in lowest position, wheels locked, appropriate side rails in place.

## 2023-05-06 NOTE — ED ADULT NURSE NOTE - NS_SISCREENINGSR_GEN_ALL_ED
[de-identified] : 53yo f with right hamstring strain/ intermittent R knee effusion\par \par 1) Aleve as needed\par 2) cryotherapy\par 3) Patient has failed 6 weeks of conservative management, including PT. Will obtain R knee MRI to eval MMT\par 4) f/u after MRI. Will consider custom knee brace at that time\par \par Patient seen by Zoraida Hayward PA-C, with and under the supervision of Dr. Jong Quinones M.D. \par  Negative

## 2023-05-24 ENCOUNTER — APPOINTMENT (OUTPATIENT)
Dept: ENDOCRINOLOGY | Facility: CLINIC | Age: 33
End: 2023-05-24
Payer: MEDICARE

## 2023-05-24 VITALS
WEIGHT: 180 LBS | OXYGEN SATURATION: 98 % | HEART RATE: 91 BPM | DIASTOLIC BLOOD PRESSURE: 82 MMHG | BODY MASS INDEX: 27.28 KG/M2 | SYSTOLIC BLOOD PRESSURE: 122 MMHG | HEIGHT: 68 IN

## 2023-05-24 PROBLEM — F84.5 ASPERGER'S SYNDROME: Chronic | Status: ACTIVE | Noted: 2023-05-05

## 2023-05-24 PROCEDURE — 99214 OFFICE O/P EST MOD 30 MIN: CPT

## 2023-09-04 ENCOUNTER — NON-APPOINTMENT (OUTPATIENT)
Age: 33
End: 2023-09-04

## 2023-09-05 ENCOUNTER — NON-APPOINTMENT (OUTPATIENT)
Age: 33
End: 2023-09-05

## 2023-09-07 ENCOUNTER — NON-APPOINTMENT (OUTPATIENT)
Age: 33
End: 2023-09-07

## 2023-10-11 ENCOUNTER — NON-APPOINTMENT (OUTPATIENT)
Age: 33
End: 2023-10-11

## 2023-11-09 ENCOUNTER — EMERGENCY (EMERGENCY)
Facility: HOSPITAL | Age: 33
LOS: 0 days | Discharge: HOME | End: 2023-11-09
Attending: EMERGENCY MEDICINE
Payer: MEDICARE

## 2023-11-09 VITALS — WEIGHT: 184.97 LBS | HEIGHT: 68 IN

## 2023-11-09 VITALS
DIASTOLIC BLOOD PRESSURE: 80 MMHG | HEART RATE: 78 BPM | RESPIRATION RATE: 18 BRPM | SYSTOLIC BLOOD PRESSURE: 128 MMHG | OXYGEN SATURATION: 98 % | TEMPERATURE: 98 F

## 2023-11-09 DIAGNOSIS — S05.01XA INJURY OF CONJUNCTIVA AND CORNEAL ABRASION WITHOUT FOREIGN BODY, RIGHT EYE, INITIAL ENCOUNTER: ICD-10-CM
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
## 2023-11-09 DIAGNOSIS — F84.5 ASPERGER'S SYNDROME: ICD-10-CM

## 2023-11-09 DIAGNOSIS — Z88.0 ALLERGY STATUS TO PENICILLIN: ICD-10-CM

## 2023-11-09 PROCEDURE — 99284 EMERGENCY DEPT VISIT MOD MDM: CPT

## 2023-11-09 PROCEDURE — 99283 EMERGENCY DEPT VISIT LOW MDM: CPT

## 2023-11-09 RX ORDER — OFLOXACIN 0.3 %
2 DROPS OPHTHALMIC (EYE) ONCE
Refills: 0 | Status: COMPLETED | OUTPATIENT
Start: 2023-11-09 | End: 2023-11-09

## 2023-11-09 RX ORDER — FLUORESCEIN SODIUM 9 MG
1 STRIP OPHTHALMIC (EYE) ONCE
Refills: 0 | Status: COMPLETED | OUTPATIENT
Start: 2023-11-09 | End: 2023-11-09

## 2023-11-09 RX ORDER — OFLOXACIN 0.3 %
1 DROPS OPHTHALMIC (EYE) ONCE
Refills: 0 | Status: DISCONTINUED | OUTPATIENT
Start: 2023-11-09 | End: 2023-11-09

## 2023-11-09 RX ADMIN — Medication 1 APPLICATION(S): at 12:05

## 2023-11-09 RX ADMIN — Medication 1 DROP(S): at 12:05

## 2023-11-09 RX ADMIN — Medication 2 DROP(S): at 12:35

## 2023-11-09 NOTE — ED STATDOCS - PATIENT PORTAL LINK FT
You can access the FollowMyHealth Patient Portal offered by [facility] by registering at the following website: [email or web address]. By joining [employer]’s FollowMyHealth portal, you will also be able to view your health information using other applications (apps) compatible with our system.

## 2023-11-09 NOTE — ED STATDOCS - NSFOLLOWUPCLINICS_GEN_ALL_ED_FT
[facility]hthal[facility]  Ophthalmology  [address] [place] [address], [facility] [address]  [place] [address]  Phone: [phone]  Fax:

## 2023-11-09 NOTE — ED STATDOCS - CARE PROVIDER_API CALL
[facility]  Ophthalmology  [address] [place] [address]  Phone: [phone]  Fax: [phone]  Follow Up Time:

## 2023-11-09 NOTE — ED STATDOCS - PHYSICAL EXAMINATION
Constitutional: NAD AAOx3  Eyes: EOMI, pupils equal, mild redness subconjunctival, no active drainage, No pain with EOM  Head: Normocephalic atraumatic  Mouth: no airway obstruction  Cardiac: regular rate   Resp: Lungs CTAB  GI: Abd s/nt/nd  Neuro: CN2-12 intact  Skin: No rashes

## 2023-11-09 NOTE — ED STATDOCS - CLINICAL SUMMARY MEDICAL DECISION MAKING FREE TEXT BOX
32-year-old male past medical history bilateral cataract surgery 20+ years ago presents with right thigh blurriness after being scratched by his cat 1 week ago.  Patient reports since this incident having worsening blurry vision and pain.  No double vision.  No swelling around the eye.  No redness to the area.  No fevers chills. no contacts Vital signs stable.  Eye exam with full extraocular movements.  Left pupil 4 reactive right pupil 2 reactive at baseline sizes.  Fluorescein exam with no obvious large abrasions.  The patient is being admitted to with no evidence of globe rupture.  Concerning for abrasion.  Will give with eyedrops and felt follow-up.

## 2023-11-09 NOTE — ED STATDOCS - ATTENDING CONTRIBUTION TO CARE
I, Tammy Garcia MD, personally saw the patient with the resident, and completed the key components of the history and physical exam. I then discussed the management plan with the resident.

## 2023-11-09 NOTE — ED STATDOCS - PROGRESS NOTE DETAILS
Rafia Kumari for attending Dr. Garcia   34 yo male presents to the ED c/o cat scratching his right eye 1 week ago. Pt now with discomfort yesterday morning. Pt wear glasses, no contacts. No drainage from the eyes. No fever, no sore throat.   Pt with eye discomfort over the  last 24-48 hrs scratched one week ago, no corneal abrasions seen, mild redness subconjunctival. Plan antibiotics and follow up PMD.

## 2023-11-09 NOTE — ED ADULT NURSE NOTE - ALCOHOL PRE SCREEN (AUDIT - C)
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  plans to rejoin gym soon, when it re opens.\par still has braces on, will come off in 2-3 months\par fully vaccinated for Covid\par am glucose ranging mostly .   low 84, max 144\par saw ophtho in December, has very early, slight macular degeneration in L eye, nothing to do at this time\par no chest pain, SOB or neuropathy symptoms\par labs from 2/21 reviewed:  A1c still low, 5.9%\par \par no FH of diabetes or kidney disease\par \par Meds:\par metformin 500mg BID(diarrhea on the 850 mg dose)\par Trulicity 1.5mg/week\par Ventolin prn, Symbicort 80/4.5, montelukast 10mg\par K citrate, losartan 50mg\par simvastatin 20mg, fenofibrate 160mg\par Allegra prn, hydroxyzine prn\par Truvada Prep\par zolpidem 10mg prn\par Nuvigial 150mg/day\par Mvi, B12, coQ10, psyllium, fish oil\par Previous meds: Jardiance (changed to Steglatro), Steglatro (didn't need) Statement Selected

## 2023-11-09 NOTE — ED ADULT NURSE NOTE - OBJECTIVE STATEMENT
Ambulatory to ER with c/o scratch in R eye by cat. Patient a & o x 4 respirations even and unlabored on room air. Await MD hogue.

## 2023-11-13 ENCOUNTER — APPOINTMENT (OUTPATIENT)
Dept: OPHTHALMOLOGY | Facility: CLINIC | Age: 33
End: 2023-11-13

## 2023-12-26 ENCOUNTER — NON-APPOINTMENT (OUTPATIENT)
Age: 33
End: 2023-12-26

## 2023-12-26 ENCOUNTER — APPOINTMENT (OUTPATIENT)
Dept: OPHTHALMOLOGY | Facility: CLINIC | Age: 33
End: 2023-12-26
Payer: MEDICARE

## 2023-12-26 PROCEDURE — 92004 COMPRE OPH EXAM NEW PT 1/>: CPT

## 2024-01-09 ENCOUNTER — LABORATORY RESULT (OUTPATIENT)
Age: 34
End: 2024-01-09

## 2024-01-16 ENCOUNTER — APPOINTMENT (OUTPATIENT)
Dept: ENDOCRINOLOGY | Facility: CLINIC | Age: 34
End: 2024-01-16
Payer: MEDICARE

## 2024-01-16 VITALS
HEART RATE: 98 BPM | BODY MASS INDEX: 26.37 KG/M2 | HEIGHT: 68 IN | WEIGHT: 174 LBS | OXYGEN SATURATION: 99 % | DIASTOLIC BLOOD PRESSURE: 74 MMHG | SYSTOLIC BLOOD PRESSURE: 104 MMHG

## 2024-01-16 PROCEDURE — 99214 OFFICE O/P EST MOD 30 MIN: CPT

## 2024-01-16 NOTE — REVIEW OF SYSTEMS
[Eye Pain] : pain [Fatigue] : no fatigue [Recent Weight Gain (___ Lbs)] : no recent weight gain [Recent Weight Loss (___ Lbs)] : recent weight loss: [unfilled] lbs [Visual Field Defect] : no visual field defect [Dry Eyes] : no dryness [Dysphagia] : no dysphagia [Neck Pain] : no neck pain [Dysphonia] : no dysphonia [Chest Pain] : no chest pain [Palpitations] : no palpitations [Lower Ext Edema] : no lower extremity edema [Shortness Of Breath] : no shortness of breath [Cough] : no cough [Orthopnea] : no orthopnea [Nausea] : no nausea [Constipation] : no constipation [Abdominal Pain] : no abdominal pain [Diarrhea] : no diarrhea [Polyuria] : no polyuria [Dysuria] : no dysuria [Joint Pain] : no joint pain [Muscle Weakness] : no muscle weakness [Myalgia] : no myalgia  [Acanthosis] : no acanthosis  [Acne] : no acne [Dry Skin] : no dry skin [Headaches] : no headaches [Dizziness] : no dizziness [Tremors] : no tremors [Depression] : no depression [Insomnia] : no insomnia [Anxiety] : no anxiety [Polydipsia] : no polydipsia [Cold Intolerance] : no cold intolerance [Heat Intolerance] : no heat intolerance [Easy Bleeding] : no ~M tendency for easy bleeding [Easy Bruising] : no tendency for easy bruising

## 2024-01-16 NOTE — HISTORY OF PRESENT ILLNESS
[FreeTextEntry1] : Ney is a 33 yr old male, here for eval of pituitary adenoma.  Past history: ''He  was looking into male to female transformation, per mother he started exploring online and  got some hormones form online transgender doctor, took estrogen and spironolactone for 2 months.  Mother says his body changed in a few weeks, he gained weight, developed breast. She then found out that he was taking hormones, she stopped him and recommended to see his PCP and get evaluated properly. She then did blood work found his prolactin was high, it continued to be high despite stopping estrogen. Also on resperidone and depakote since 3 years. His prolactin was high around 115 in 4/21, so brain MRI was done.''  He then had brain MRI done in 5/21  showed  4.9 mm probable pituitary micro adenoma. Some Breast tenderness,  no discharge, weight gain about 60 pound sin 1 yr, no big purple stretch marks,no easy bruising, no BP or sugar issues.No LOC, no dizziness, no change in shoe or ring size. Denies heat or cold intolerance, no constipation or diarrhea, no palpitations,  no skin or hair changes. We checked his pituitary hormones, all came back normal, only prolactin was high 115 in 4/21 and 77 in 2/22. Then we started him on cabergoline o.25 mg weekly IN 2/22. Repeat prolactin still around 80 in 1/23 so we increase dose of cabergoline to 0.5 mg weekly. Had repeat MRI in 5/21 , pituitary micro adenoma not seen on this MRI.  here for follow up.,still some breast tenderness, No other complains, no new headaches, no visual changes, lost some weight.

## 2024-01-16 NOTE — ASSESSMENT
[FreeTextEntry1] : Ney is a 33 yr old male, here for eval of pituitary adenoma.  He was looking into male to female transformation, per mother he started exploring online and last year got some hormones form online transgender doctor, took estrogen and spironolactone for 2 months. Mother says his body changed in a few weeks, he gained weight, developed breast. She then found out that he was taking hormones, she stopped him and recommended to se his PCP and get evaluated properly.  She then did blood work found his prolactin was high, it continued yo be high despite stopping estrogen. He then had brain MRI done showed 4.9 mm probable pituitary micro adenoma.   Last visit we checked his pituitary hormones, all came back normal, only prolactin was high 115 in 4/21 and 77 in 2/22. Then we started him on cabergoline o.25 mg weekly in 2/22 He has h/o VSD as child, we recommended to get baseline Echo, he got it in 4/22, no concerns on it.  Repeat prolactin was still around 80 in 1/23 so we increase dose of cabergoline to 0.5 mg weekly.  Had repeat MRI in 5/23 , pituitary micro adenoma not seen on this MRI.  Recent Prolactin  44.7, coming down. But he still has some tenderness, so will go up on dose to 1 and 1/2  pill per week ,  to  get levels in normal range. Rest all pituitary hormones normal. Will repeat labs next visit.   RTC in 3 months with labs

## 2024-02-23 ENCOUNTER — NON-APPOINTMENT (OUTPATIENT)
Age: 34
End: 2024-02-23

## 2024-03-27 ENCOUNTER — LABORATORY RESULT (OUTPATIENT)
Age: 34
End: 2024-03-27

## 2024-04-16 ENCOUNTER — APPOINTMENT (OUTPATIENT)
Dept: ENDOCRINOLOGY | Facility: CLINIC | Age: 34
End: 2024-04-16
Payer: MEDICARE

## 2024-04-16 VITALS
WEIGHT: 176 LBS | BODY MASS INDEX: 26.67 KG/M2 | DIASTOLIC BLOOD PRESSURE: 72 MMHG | HEART RATE: 117 BPM | HEIGHT: 68 IN | SYSTOLIC BLOOD PRESSURE: 104 MMHG | OXYGEN SATURATION: 96 %

## 2024-04-16 DIAGNOSIS — D35.2 BENIGN NEOPLASM OF PITUITARY GLAND: ICD-10-CM

## 2024-04-16 DIAGNOSIS — R79.89 OTHER SPECIFIED ABNORMAL FINDINGS OF BLOOD CHEMISTRY: ICD-10-CM

## 2024-04-16 PROCEDURE — 99214 OFFICE O/P EST MOD 30 MIN: CPT

## 2024-04-16 NOTE — REVIEW OF SYSTEMS
[Eye Pain] : pain [Fatigue] : no fatigue [Recent Weight Gain (___ Lbs)] : no recent weight gain [Recent Weight Loss (___ Lbs)] : no recent weight loss [Visual Field Defect] : no visual field defect [Dry Eyes] : no dryness [Dysphagia] : no dysphagia [Neck Pain] : no neck pain [Dysphonia] : no dysphonia [Chest Pain] : no chest pain [Palpitations] : no palpitations [Lower Ext Edema] : no lower extremity edema [Shortness Of Breath] : no shortness of breath [Cough] : no cough [Orthopnea] : no orthopnea [Nausea] : no nausea [Constipation] : no constipation [Abdominal Pain] : no abdominal pain [Diarrhea] : no diarrhea [Polyuria] : no polyuria [Dysuria] : no dysuria [Joint Pain] : no joint pain [Muscle Weakness] : no muscle weakness [Myalgia] : no myalgia  [Acanthosis] : no acanthosis  [Acne] : no acne [Dry Skin] : no dry skin [Headaches] : no headaches [Dizziness] : no dizziness [Tremors] : no tremors [Depression] : no depression [Insomnia] : no insomnia [Anxiety] : no anxiety [Polydipsia] : no polydipsia [Heat Intolerance] : no heat intolerance [Cold Intolerance] : no cold intolerance [Easy Bleeding] : no ~M tendency for easy bleeding [Easy Bruising] : no tendency for easy bruising

## 2024-04-16 NOTE — ASSESSMENT
[FreeTextEntry1] : Ney is a 33 yr old male, here for eval of pituitary adenoma.  He was looking into male to female transformation, per mother he started exploring online and last year got some hormones form online transgender doctor, took estrogen and spironolactone for 2 months. Mother says his body changed in a few weeks, he gained weight, developed breast. She then found out that he was taking hormones, she stopped him and recommended to se his PCP and get evaluated properly.  She then did blood work found his prolactin was high, it continued yo be high despite stopping estrogen. He then had brain MRI done showed 4.9 mm probable pituitary micro adenoma. We checked his pituitary hormones, all came back normal, only prolactin was high 115 in 4/21 and 77 in 2/22. Then we started him on cabergoline o.25 mg weekly in 2/22 He has h/o VSD as child, we recommended to get baseline Echo, he got it in 4/22, no concerns on it.  Repeat prolactin was still around 80 in 1/23 so we increase dose of cabergoline to 0.5 mg weekly.  Had repeat MRI in 5/23 , pituitary micro adenoma not seen on this MRI.  Recent Prolactin  44.7 IN 1/24 , coming down. Now  52  in  4/24 But he still had some tenderness, so we went up up on dose to 1 and 1/2  pill per week ,  Now  prolactin 52  in  4/24, rest all labs came back normal. But he tells me his dose of resperidone was increased recently. Probably high prolactin now because of that Will continue with same dose for now. Will repeat labs next visit.   RTC in 4 months with labs

## 2024-04-16 NOTE — PHYSICAL EXAM
[Alert] : alert [Well Nourished] : well nourished [No Acute Distress] : no acute distress [Normal Sclera/Conjunctiva] : normal sclera/conjunctiva [PERRL] : pupils equal, round and reactive to light [No Proptosis] : no proptosis [No Lid Lag] : no lid lag [No Neck Mass] : no neck mass was observed [Thyroid Not Enlarged] : the thyroid was not enlarged [No Thyroid Nodules] : no palpable thyroid nodules [No Respiratory Distress] : no respiratory distress [No Accessory Muscle Use] : no accessory muscle use [Clear to Auscultation] : lungs were clear to auscultation bilaterally [Normal S1, S2] : normal S1 and S2 [Normal Rate] : heart rate was normal [Regular Rhythm] : with a regular rhythm [Oriented x3] : oriented to person, place, and time [Normal Affect] : the affect was normal [Normal Mood] : the mood was normal

## 2024-04-16 NOTE — HISTORY OF PRESENT ILLNESS
[FreeTextEntry1] : Ney is a 33 yr old male, here for eval of pituitary adenoma.  Past history: ''He  was looking into male to female transformation, per mother he started exploring online and  got some hormones form online transgender doctor, took estrogen and spironolactone for 2 months.  Mother says his body changed in a few weeks, he gained weight, developed breast. She then found out that he was taking hormones, she stopped him and recommended to see his PCP and get evaluated properly. She then did blood work found his prolactin was high, it continued to be high despite stopping estrogen. Also on resperidone and depakote since 3 years. His prolactin was high around 115 in 4/21, so brain MRI was done.''  He then had brain MRI done in 5/21  showed  4.9 mm probable pituitary micro adenoma. Some Breast tenderness,  no discharge, weight gain about 60 pound sin 1 yr, no big purple stretch marks,no easy bruising, no BP or sugar issues.No LOC, no dizziness, no change in shoe or ring size. Denies heat or cold intolerance, no constipation or diarrhea, no palpitations,  no skin or hair changes. We checked his pituitary hormones, all came back normal, only prolactin was high 115 in 4/21 and 77 in 2/22. Then we started him on cabergoline o.25 mg weekly IN 2/22. Repeat prolactin still around 80 in 1/23 so we increase dose of cabergoline to 0.5 mg weekly. Had repeat MRI in 5/21 , pituitary micro adenoma not seen on this MRI. Recent Prolactin  44.7 IN 1/24 , coming down. Now  52  in  4/24 But he still had some tenderness, so we went up up on dose to 1 and 1/2  pill per week ,  Now  prolactin 52  in  4/24, rest all labs came back normal. But he tells me his dose of resperidone was increased recently.  here for follow up. No breast tenderness, no discharge. No other complains, no new headaches, no visual changes, lost some weight.

## 2024-06-25 ENCOUNTER — RX RENEWAL (OUTPATIENT)
Age: 34
End: 2024-06-25

## 2024-06-25 RX ORDER — CABERGOLINE 0.5 MG/1
0.5 TABLET ORAL
Qty: 18 | Refills: 4 | Status: ACTIVE | COMMUNITY
Start: 2022-02-23 | End: 1900-01-01

## 2024-08-13 ENCOUNTER — APPOINTMENT (OUTPATIENT)
Dept: INTERNAL MEDICINE | Facility: CLINIC | Age: 34
End: 2024-08-13
Payer: MEDICARE

## 2024-08-13 ENCOUNTER — APPOINTMENT (OUTPATIENT)
Dept: ENDOCRINOLOGY | Facility: CLINIC | Age: 34
End: 2024-08-13

## 2024-08-13 VITALS
BODY MASS INDEX: 25.62 KG/M2 | HEART RATE: 88 BPM | OXYGEN SATURATION: 98 % | WEIGHT: 173 LBS | SYSTOLIC BLOOD PRESSURE: 120 MMHG | HEIGHT: 69 IN | DIASTOLIC BLOOD PRESSURE: 80 MMHG

## 2024-08-13 DIAGNOSIS — M54.50 LOW BACK PAIN, UNSPECIFIED: ICD-10-CM

## 2024-08-13 DIAGNOSIS — Z11.4 ENCOUNTER FOR SCREENING FOR HUMAN IMMUNODEFICIENCY VIRUS [HIV]: ICD-10-CM

## 2024-08-13 DIAGNOSIS — Z01.818 ENCOUNTER FOR OTHER PREPROCEDURAL EXAMINATION: ICD-10-CM

## 2024-08-13 DIAGNOSIS — H26.20: ICD-10-CM

## 2024-08-13 DIAGNOSIS — J06.9 ACUTE UPPER RESPIRATORY INFECTION, UNSPECIFIED: ICD-10-CM

## 2024-08-13 DIAGNOSIS — Z78.9 OTHER SPECIFIED HEALTH STATUS: ICD-10-CM

## 2024-08-13 PROCEDURE — 99203 OFFICE O/P NEW LOW 30 MIN: CPT

## 2024-08-14 PROBLEM — Z78.9 NON-SMOKER: Status: ACTIVE | Noted: 2024-08-13

## 2024-08-14 PROBLEM — H26.20: Status: ACTIVE | Noted: 2024-08-14

## 2024-08-14 PROBLEM — M54.50 LEFT LOW BACK PAIN, UNSPECIFIED CHRONICITY, UNSPECIFIED WHETHER SCIATICA PRESENT: Status: ACTIVE | Noted: 2024-08-13

## 2024-08-14 LAB
BASOPHILS # BLD AUTO: 0.06 K/UL
BASOPHILS NFR BLD AUTO: 1 %
EOSINOPHIL # BLD AUTO: 0.12 K/UL
EOSINOPHIL NFR BLD AUTO: 2 %
HCT VFR BLD CALC: 46 %
HGB BLD-MCNC: 15 G/DL
IMM GRANULOCYTES NFR BLD AUTO: 0.2 %
LYMPHOCYTES # BLD AUTO: 2.57 K/UL
LYMPHOCYTES NFR BLD AUTO: 42 %
MAN DIFF?: NORMAL
MCHC RBC-ENTMCNC: 29.2 PG
MCHC RBC-ENTMCNC: 32.6 GM/DL
MCV RBC AUTO: 89.5 FL
MONOCYTES # BLD AUTO: 0.28 K/UL
MONOCYTES NFR BLD AUTO: 4.6 %
NEUTROPHILS # BLD AUTO: 3.08 K/UL
NEUTROPHILS NFR BLD AUTO: 50.2 %
PLATELET # BLD AUTO: 212 K/UL
RBC # BLD: 5.14 M/UL
RBC # FLD: 13.8 %
WBC # FLD AUTO: 6.12 K/UL

## 2024-08-14 NOTE — PHYSICAL EXAM
[No Acute Distress] : no acute distress [Well Nourished] : well nourished [No JVD] : no jugular venous distention [No Lymphadenopathy] : no lymphadenopathy [No Respiratory Distress] : no respiratory distress  [No Accessory Muscle Use] : no accessory muscle use [Clear to Auscultation] : lungs were clear to auscultation bilaterally [Normal Rate] : normal rate  [Regular Rhythm] : with a regular rhythm [No Abdominal Bruit] : a ~M bruit was not heard ~T in the abdomen [No Edema] : there was no peripheral edema [Soft] : abdomen soft [Non Tender] : non-tender [No HSM] : no HSM [Normal Bowel Sounds] : normal bowel sounds [Normal Supraclavicular Nodes] : no supraclavicular lymphadenopathy [Normal Anterior Cervical Nodes] : no anterior cervical lymphadenopathy [Kyphosis] : kyphosis [Grossly Normal Strength/Tone] : grossly normal strength/tone [No Focal Deficits] : no focal deficits [Speech Grossly Normal] : speech grossly normal [Alert and Oriented x3] : oriented to person, place, and time [de-identified] : pinnae deformities bilaterally, + cerumen, edentulous, [de-identified] : tip-toe gait

## 2024-08-14 NOTE — ADDENDUM
[FreeTextEntry1] : Patient also c/o back pain-suspicion that it's likely related to his kyphosis- Trial of PT prescribed-

## 2024-08-14 NOTE — PHYSICAL EXAM
[No Acute Distress] : no acute distress [Well Nourished] : well nourished [No JVD] : no jugular venous distention [No Lymphadenopathy] : no lymphadenopathy [No Respiratory Distress] : no respiratory distress  [No Accessory Muscle Use] : no accessory muscle use [Clear to Auscultation] : lungs were clear to auscultation bilaterally [Normal Rate] : normal rate  [Regular Rhythm] : with a regular rhythm [No Abdominal Bruit] : a ~M bruit was not heard ~T in the abdomen [No Edema] : there was no peripheral edema [Soft] : abdomen soft [Non Tender] : non-tender [No HSM] : no HSM [Normal Bowel Sounds] : normal bowel sounds [Normal Supraclavicular Nodes] : no supraclavicular lymphadenopathy [Normal Anterior Cervical Nodes] : no anterior cervical lymphadenopathy [Kyphosis] : kyphosis [Grossly Normal Strength/Tone] : grossly normal strength/tone [No Focal Deficits] : no focal deficits [Speech Grossly Normal] : speech grossly normal [Alert and Oriented x3] : oriented to person, place, and time [de-identified] : tip-toe gait [de-identified] : pinnae deformities bilaterally, + cerumen, edentulous,

## 2024-08-14 NOTE — REVIEW OF SYSTEMS
[Patient Intake Form Reviewed] : Patient intake form was reviewed [Vision Problems] : vision problems [Shortness Of Breath] : shortness of breath [Joint Stiffness] : joint stiffness [Negative] : Heme/Lymph

## 2024-08-14 NOTE — HISTORY OF PRESENT ILLNESS
[No Pertinent Pulmonary History] : no history of asthma, COPD, sleep apnea, or smoking [No Adverse Anesthesia Reaction] : no adverse anesthesia reaction in self or family member [(Patient denies any chest pain, claudication, dyspnea on exertion, orthopnea, palpitations or syncope)] : Patient denies any chest pain, claudication, dyspnea on exertion, orthopnea, palpitations or syncope [Poor (<4 METs)] : Poor (<4 METs) [Parent] : parent [Aortic Stenosis] : no aortic stenosis [Atrial Fibrillation] : no atrial fibrillation [Coronary Artery Disease] : no coronary artery disease [Recent Myocardial Infarction] : no recent myocardial infarction [Implantable Device/Pacemaker] : no implantable device/pacemaker [Chronic Anticoagulation] : no chronic anticoagulation [Chronic Kidney Disease] : no chronic kidney disease [Diabetes] : no diabetes [FreeTextEntry1] : Pars plana Vitrectomy, Exchange IOL OD [FreeTextEntry2] : 8/15/24 [FreeTextEntry3] : Dr. Turner

## 2024-08-14 NOTE — ASSESSMENT
[Patient Optimized for Surgery] : Patient optimized for surgery [No Further Testing Recommended] : no further testing recommended [As per surgery] : as per surgery [FreeTextEntry4] : SALIMA ABEL was seen and examined. There are no clinical indicators suggestive of active cardiac disease. There are no medical contraindications to planned procedure. Patient is optimized and able to proceed directly to the OR.

## 2024-08-18 LAB
ALBUMIN SERPL ELPH-MCNC: 5 G/DL
ALP BLD-CCNC: 130 U/L
ALT SERPL-CCNC: 69 U/L
ANION GAP SERPL CALC-SCNC: 14 MMOL/L
AST SERPL-CCNC: 56 U/L
BILIRUB SERPL-MCNC: 0.6 MG/DL
BUN SERPL-MCNC: 15 MG/DL
CALCIUM SERPL-MCNC: 10.2 MG/DL
CHLORIDE SERPL-SCNC: 100 MMOL/L
CO2 SERPL-SCNC: 27 MMOL/L
CREAT SERPL-MCNC: 1.04 MG/DL
EGFR: 98 ML/MIN/1.73M2
GLUCOSE SERPL-MCNC: 128 MG/DL
POTASSIUM SERPL-SCNC: 4.3 MMOL/L
PROT SERPL-MCNC: 7.1 G/DL
SODIUM SERPL-SCNC: 141 MMOL/L

## 2024-08-21 ENCOUNTER — NON-APPOINTMENT (OUTPATIENT)
Age: 34
End: 2024-08-21

## 2024-08-27 ENCOUNTER — NON-APPOINTMENT (OUTPATIENT)
Age: 34
End: 2024-08-27

## 2024-08-28 PROBLEM — Z86.59 PERSONAL HISTORY OF OTHER MENTAL AND BEHAVIORAL DISORDERS: Chronic | Status: ACTIVE | Noted: 2024-08-15

## 2024-08-31 ENCOUNTER — NON-APPOINTMENT (OUTPATIENT)
Age: 34
End: 2024-08-31

## 2024-09-14 ENCOUNTER — NON-APPOINTMENT (OUTPATIENT)
Age: 34
End: 2024-09-14

## 2024-09-18 ENCOUNTER — APPOINTMENT (OUTPATIENT)
Dept: INTERNAL MEDICINE | Facility: CLINIC | Age: 34
End: 2024-09-18
Payer: MEDICARE

## 2024-09-18 VITALS
SYSTOLIC BLOOD PRESSURE: 122 MMHG | HEART RATE: 83 BPM | DIASTOLIC BLOOD PRESSURE: 80 MMHG | WEIGHT: 176 LBS | TEMPERATURE: 97.2 F | OXYGEN SATURATION: 97 % | BODY MASS INDEX: 25.99 KG/M2

## 2024-09-18 DIAGNOSIS — R79.89 OTHER SPECIFIED ABNORMAL FINDINGS OF BLOOD CHEMISTRY: ICD-10-CM

## 2024-09-18 PROCEDURE — 99213 OFFICE O/P EST LOW 20 MIN: CPT

## 2024-09-18 PROCEDURE — G2211 COMPLEX E/M VISIT ADD ON: CPT

## 2024-09-21 PROBLEM — R79.89 ABNORMAL LIVER FUNCTION TEST: Status: ACTIVE | Noted: 2024-09-18

## 2024-09-21 NOTE — HISTORY OF PRESENT ILLNESS
[Parent] : parent [de-identified] : SALIMA ABEL is a 34 year M who presents today to f/u for abnl LFTs detected on labs ordered as Pre-op. Both transaminases and the A.phos were elevated-none were severely elevated, as they were all less than twice the upper limit of normal. He rarely drinks ETOH. He has Bipolar Disorder- and is maintained on Depakote by psychiatry. The Depakote dose was recently increased from 500 mg to 1000 mg daily. He is also talking an increased dose of Risperidone.  He has no symptoms of hepatotoxicity. Denies nausea, vomiting or anorexia.

## 2024-09-21 NOTE — HISTORY OF PRESENT ILLNESS
[Parent] : parent [de-identified] : SALIMA ABEL is a 34 year M who presents today to f/u for abnl LFTs detected on labs ordered as Pre-op. Both transaminases and the A.phos were elevated-none were severely elevated, as they were all less than twice the upper limit of normal. He rarely drinks ETOH. He has Bipolar Disorder- and is maintained on Depakote by psychiatry. The Depakote dose was recently increased from 500 mg to 1000 mg daily. He is also talking an increased dose of Risperidone.  He has no symptoms of hepatotoxicity. Denies nausea, vomiting or anorexia.

## 2024-09-21 NOTE — PHYSICAL EXAM
[No Acute Distress] : no acute distress [Well Nourished] : well nourished [Normal Sclera/Conjunctiva] : normal sclera/conjunctiva [Normal Outer Ear/Nose] : the outer ears and nose were normal in appearance [No Respiratory Distress] : no respiratory distress  [No Accessory Muscle Use] : no accessory muscle use [Normal Rate] : normal rate  [No Edema] : there was no peripheral edema [Kyphosis] : kyphosis [Grossly Normal Strength/Tone] : grossly normal strength/tone

## 2024-10-09 ENCOUNTER — NON-APPOINTMENT (OUTPATIENT)
Age: 34
End: 2024-10-09

## 2024-10-09 ENCOUNTER — TRANSCRIPTION ENCOUNTER (OUTPATIENT)
Age: 34
End: 2024-10-09

## 2025-01-06 ENCOUNTER — APPOINTMENT (OUTPATIENT)
Dept: INTERNAL MEDICINE | Facility: CLINIC | Age: 35
End: 2025-01-06
Payer: MEDICARE

## 2025-01-06 VITALS
OXYGEN SATURATION: 98 % | BODY MASS INDEX: 26.88 KG/M2 | TEMPERATURE: 97.3 F | HEART RATE: 84 BPM | DIASTOLIC BLOOD PRESSURE: 78 MMHG | SYSTOLIC BLOOD PRESSURE: 120 MMHG | WEIGHT: 182 LBS

## 2025-01-06 DIAGNOSIS — R06.02 SHORTNESS OF BREATH: ICD-10-CM

## 2025-01-06 DIAGNOSIS — R06.4 HYPERVENTILATION: ICD-10-CM

## 2025-01-06 PROCEDURE — G2211 COMPLEX E/M VISIT ADD ON: CPT

## 2025-01-06 PROCEDURE — 99214 OFFICE O/P EST MOD 30 MIN: CPT

## 2025-01-10 LAB
ALBUMIN SERPL ELPH-MCNC: 4.9 G/DL
ALP BLD-CCNC: 103 U/L
ALT SERPL-CCNC: 36 U/L
ANION GAP SERPL CALC-SCNC: 15 MMOL/L
AST SERPL-CCNC: 25 U/L
BASOPHILS # BLD AUTO: 0.06 K/UL
BASOPHILS NFR BLD AUTO: 1 %
BILIRUB SERPL-MCNC: 0.4 MG/DL
BUN SERPL-MCNC: 12 MG/DL
CALCIUM SERPL-MCNC: 10.2 MG/DL
CHLORIDE SERPL-SCNC: 100 MMOL/L
CO2 SERPL-SCNC: 25 MMOL/L
CREAT SERPL-MCNC: 0.85 MG/DL
DEPRECATED D DIMER PPP IA-ACNC: <150 NG/ML DDU
EGFR: 118 ML/MIN/1.73M2
EOSINOPHIL # BLD AUTO: 0.32 K/UL
EOSINOPHIL NFR BLD AUTO: 5.3 %
GLUCOSE SERPL-MCNC: 84 MG/DL
HCT VFR BLD CALC: 44.8 %
HGB BLD-MCNC: 14.9 G/DL
IMM GRANULOCYTES NFR BLD AUTO: 0.2 %
LYMPHOCYTES # BLD AUTO: 2.66 K/UL
LYMPHOCYTES NFR BLD AUTO: 43.9 %
MAN DIFF?: NORMAL
MCHC RBC-ENTMCNC: 28.3 PG
MCHC RBC-ENTMCNC: 33.3 G/DL
MCV RBC AUTO: 85 FL
MONOCYTES # BLD AUTO: 0.44 K/UL
MONOCYTES NFR BLD AUTO: 7.3 %
NEUTROPHILS # BLD AUTO: 2.57 K/UL
NEUTROPHILS NFR BLD AUTO: 42.3 %
PLATELET # BLD AUTO: 202 K/UL
POTASSIUM SERPL-SCNC: 4.3 MMOL/L
PROT SERPL-MCNC: 7 G/DL
RBC # BLD: 5.27 M/UL
RBC # FLD: 12.5 %
SODIUM SERPL-SCNC: 140 MMOL/L
WBC # FLD AUTO: 6.06 K/UL

## 2025-01-13 ENCOUNTER — NON-APPOINTMENT (OUTPATIENT)
Age: 35
End: 2025-01-13

## 2025-02-03 ENCOUNTER — NON-APPOINTMENT (OUTPATIENT)
Age: 35
End: 2025-02-03

## 2025-03-11 ENCOUNTER — RX RENEWAL (OUTPATIENT)
Age: 35
End: 2025-03-11

## 2025-04-09 ENCOUNTER — APPOINTMENT (OUTPATIENT)
Dept: INTERNAL MEDICINE | Facility: CLINIC | Age: 35
End: 2025-04-09
Payer: MEDICARE

## 2025-04-09 VITALS
DIASTOLIC BLOOD PRESSURE: 80 MMHG | SYSTOLIC BLOOD PRESSURE: 122 MMHG | WEIGHT: 180 LBS | OXYGEN SATURATION: 97 % | TEMPERATURE: 97.7 F | BODY MASS INDEX: 26.58 KG/M2 | HEART RATE: 83 BPM

## 2025-04-09 DIAGNOSIS — R11.0 NAUSEA: ICD-10-CM

## 2025-04-09 DIAGNOSIS — L30.9 DERMATITIS, UNSPECIFIED: ICD-10-CM

## 2025-04-09 DIAGNOSIS — R10.12 LEFT UPPER QUADRANT PAIN: ICD-10-CM

## 2025-04-09 PROCEDURE — 99214 OFFICE O/P EST MOD 30 MIN: CPT

## 2025-04-09 PROCEDURE — G2211 COMPLEX E/M VISIT ADD ON: CPT

## 2025-04-09 RX ORDER — PANTOPRAZOLE 40 MG/1
40 TABLET, DELAYED RELEASE ORAL
Qty: 90 | Refills: 0 | Status: ACTIVE | COMMUNITY
Start: 2025-04-09

## 2025-04-09 RX ORDER — BETAMETHASONE DIPROPIONATE 0.5 MG/G
0.05 OINTMENT, AUGMENTED TOPICAL TWICE DAILY
Qty: 1 | Refills: 0 | Status: ACTIVE | COMMUNITY
Start: 2025-04-09 | End: 1900-01-01

## 2025-04-10 ENCOUNTER — APPOINTMENT (OUTPATIENT)
Dept: ULTRASOUND IMAGING | Facility: CLINIC | Age: 35
End: 2025-04-10
Payer: MEDICARE

## 2025-04-10 PROCEDURE — 76700 US EXAM ABDOM COMPLETE: CPT

## 2025-06-16 ENCOUNTER — APPOINTMENT (OUTPATIENT)
Dept: ENDOCRINOLOGY | Facility: CLINIC | Age: 35
End: 2025-06-16
Payer: MEDICARE

## 2025-06-16 VITALS
HEART RATE: 108 BPM | OXYGEN SATURATION: 97 % | SYSTOLIC BLOOD PRESSURE: 120 MMHG | HEIGHT: 69 IN | BODY MASS INDEX: 26.22 KG/M2 | DIASTOLIC BLOOD PRESSURE: 82 MMHG | WEIGHT: 177 LBS

## 2025-06-16 PROCEDURE — 99214 OFFICE O/P EST MOD 30 MIN: CPT

## 2025-07-11 ENCOUNTER — RX RENEWAL (OUTPATIENT)
Age: 35
End: 2025-07-11